# Patient Record
Sex: FEMALE | Race: WHITE | Employment: OTHER | URBAN - METROPOLITAN AREA
[De-identification: names, ages, dates, MRNs, and addresses within clinical notes are randomized per-mention and may not be internally consistent; named-entity substitution may affect disease eponyms.]

---

## 2017-04-24 ENCOUNTER — OFFICE VISIT (OUTPATIENT)
Dept: INTERNAL MEDICINE CLINIC | Age: 67
End: 2017-04-24

## 2017-04-24 VITALS
OXYGEN SATURATION: 95 % | WEIGHT: 134 LBS | BODY MASS INDEX: 23.74 KG/M2 | TEMPERATURE: 98.9 F | DIASTOLIC BLOOD PRESSURE: 76 MMHG | HEIGHT: 63 IN | SYSTOLIC BLOOD PRESSURE: 120 MMHG | HEART RATE: 92 BPM

## 2017-04-24 DIAGNOSIS — E55.9 HYPOVITAMINOSIS D: ICD-10-CM

## 2017-04-24 DIAGNOSIS — J20.9 ACUTE BRONCHITIS, UNSPECIFIED ORGANISM: Primary | ICD-10-CM

## 2017-04-24 DIAGNOSIS — E78.2 MIXED HYPERLIPIDEMIA: ICD-10-CM

## 2017-04-24 LAB
A/G RATIO: 1.7 (ref 1.1–2.2)
ALBUMIN SERPL-MCNC: 4.1 G/DL (ref 3.4–5)
ALP BLD-CCNC: 88 U/L (ref 40–129)
ALT SERPL-CCNC: 41 U/L (ref 10–40)
ANION GAP SERPL CALCULATED.3IONS-SCNC: 16 MMOL/L (ref 3–16)
AST SERPL-CCNC: 42 U/L (ref 15–37)
BILIRUB SERPL-MCNC: <0.2 MG/DL (ref 0–1)
BILIRUBIN, POC: NORMAL
BLOOD URINE, POC: NORMAL
BUN BLDV-MCNC: 14 MG/DL (ref 7–20)
CALCIUM SERPL-MCNC: 8.9 MG/DL (ref 8.3–10.6)
CHLORIDE BLD-SCNC: 102 MMOL/L (ref 99–110)
CHOLESTEROL, TOTAL: 165 MG/DL (ref 0–199)
CLARITY, POC: NORMAL
CO2: 24 MMOL/L (ref 21–32)
COLOR, POC: YELLOW
CREAT SERPL-MCNC: 0.7 MG/DL (ref 0.6–1.2)
GFR AFRICAN AMERICAN: >60
GFR NON-AFRICAN AMERICAN: >60
GLOBULIN: 2.4 G/DL
GLUCOSE BLD-MCNC: 101 MG/DL (ref 70–99)
GLUCOSE URINE, POC: NORMAL
HCT VFR BLD CALC: 39.3 % (ref 36–48)
HDLC SERPL-MCNC: 45 MG/DL (ref 40–60)
HEMOGLOBIN: 13.1 G/DL (ref 12–16)
KETONES, POC: NORMAL
LDL CHOLESTEROL CALCULATED: 65 MG/DL
LEUKOCYTE EST, POC: NORMAL
MCH RBC QN AUTO: 30.1 PG (ref 26–34)
MCHC RBC AUTO-ENTMCNC: 33.5 G/DL (ref 31–36)
MCV RBC AUTO: 89.9 FL (ref 80–100)
NITRITE, POC: NORMAL
PDW BLD-RTO: 14.5 % (ref 12.4–15.4)
PH, POC: 7
PLATELET # BLD: 197 K/UL (ref 135–450)
PMV BLD AUTO: 9.5 FL (ref 5–10.5)
POTASSIUM SERPL-SCNC: 4 MMOL/L (ref 3.5–5.1)
PROTEIN, POC: NORMAL
RBC # BLD: 4.37 M/UL (ref 4–5.2)
SODIUM BLD-SCNC: 142 MMOL/L (ref 136–145)
SPECIFIC GRAVITY, POC: 1.02
TOTAL PROTEIN: 6.5 G/DL (ref 6.4–8.2)
TRIGL SERPL-MCNC: 273 MG/DL (ref 0–150)
UROBILINOGEN, POC: 0.2
VLDLC SERPL CALC-MCNC: 55 MG/DL
WBC # BLD: 4.6 K/UL (ref 4–11)

## 2017-04-24 PROCEDURE — G8400 PT W/DXA NO RESULTS DOC: HCPCS | Performed by: INTERNAL MEDICINE

## 2017-04-24 PROCEDURE — 1123F ACP DISCUSS/DSCN MKR DOCD: CPT | Performed by: INTERNAL MEDICINE

## 2017-04-24 PROCEDURE — 3014F SCREEN MAMMO DOC REV: CPT | Performed by: INTERNAL MEDICINE

## 2017-04-24 PROCEDURE — 3017F COLORECTAL CA SCREEN DOC REV: CPT | Performed by: INTERNAL MEDICINE

## 2017-04-24 PROCEDURE — 36415 COLL VENOUS BLD VENIPUNCTURE: CPT | Performed by: INTERNAL MEDICINE

## 2017-04-24 PROCEDURE — 1090F PRES/ABSN URINE INCON ASSESS: CPT | Performed by: INTERNAL MEDICINE

## 2017-04-24 PROCEDURE — G8420 CALC BMI NORM PARAMETERS: HCPCS | Performed by: INTERNAL MEDICINE

## 2017-04-24 PROCEDURE — 4004F PT TOBACCO SCREEN RCVD TLK: CPT | Performed by: INTERNAL MEDICINE

## 2017-04-24 PROCEDURE — 81002 URINALYSIS NONAUTO W/O SCOPE: CPT | Performed by: INTERNAL MEDICINE

## 2017-04-24 PROCEDURE — G8427 DOCREV CUR MEDS BY ELIG CLIN: HCPCS | Performed by: INTERNAL MEDICINE

## 2017-04-24 PROCEDURE — 99214 OFFICE O/P EST MOD 30 MIN: CPT | Performed by: INTERNAL MEDICINE

## 2017-04-24 PROCEDURE — 4040F PNEUMOC VAC/ADMIN/RCVD: CPT | Performed by: INTERNAL MEDICINE

## 2017-04-24 RX ORDER — LEVOFLOXACIN 500 MG/1
500 TABLET, FILM COATED ORAL DAILY
Qty: 10 TABLET | Refills: 0 | Status: SHIPPED | OUTPATIENT
Start: 2017-04-24 | End: 2017-04-27

## 2017-04-24 RX ORDER — HYDROCODONE POLISTIREX AND CHLORPHENIRAMINE POLISTIREX 10; 8 MG/5ML; MG/5ML
5 SUSPENSION, EXTENDED RELEASE ORAL EVERY 12 HOURS PRN
Qty: 120 ML | Refills: 0 | Status: SHIPPED | OUTPATIENT
Start: 2017-04-24 | End: 2018-08-21

## 2017-04-24 RX ORDER — ATORVASTATIN CALCIUM 10 MG/1
10 TABLET, FILM COATED ORAL DAILY
Qty: 30 TABLET | Refills: 6 | Status: SHIPPED | OUTPATIENT
Start: 2017-04-24 | End: 2018-06-12 | Stop reason: SDUPTHER

## 2017-04-24 ASSESSMENT — ENCOUNTER SYMPTOMS
COLOR CHANGE: 0
DIARRHEA: 1
COUGH: 1
WHEEZING: 1
EYES NEGATIVE: 1
APNEA: 0
STRIDOR: 0
SORE THROAT: 1
CHOKING: 0
BACK PAIN: 0
ABDOMINAL PAIN: 1
SINUS PRESSURE: 0

## 2017-04-25 LAB
TSH SERPL DL<=0.05 MIU/L-ACNC: 4.04 UIU/ML (ref 0.27–4.2)
VITAMIN D 25-HYDROXY: 21.4 NG/ML

## 2017-04-27 ENCOUNTER — TELEPHONE (OUTPATIENT)
Dept: INTERNAL MEDICINE CLINIC | Age: 67
End: 2017-04-27

## 2017-04-27 DIAGNOSIS — R05.9 COUGH: Primary | ICD-10-CM

## 2017-04-27 RX ORDER — AZITHROMYCIN 250 MG/1
TABLET, FILM COATED ORAL
Qty: 6 TABLET | Refills: 0 | Status: SHIPPED | OUTPATIENT
Start: 2017-04-27 | End: 2017-05-07

## 2017-04-27 RX ORDER — ALBUTEROL SULFATE 90 UG/1
2 AEROSOL, METERED RESPIRATORY (INHALATION) EVERY 6 HOURS PRN
Qty: 1 INHALER | Refills: 0 | Status: SHIPPED | OUTPATIENT
Start: 2017-04-27 | End: 2018-08-21

## 2017-04-27 RX ORDER — METHYLPREDNISOLONE 4 MG/1
TABLET ORAL
Qty: 1 KIT | Refills: 0 | Status: SHIPPED | OUTPATIENT
Start: 2017-04-27 | End: 2017-05-03

## 2018-02-20 ENCOUNTER — NURSE ONLY (OUTPATIENT)
Dept: INTERNAL MEDICINE CLINIC | Age: 68
End: 2018-02-20

## 2018-02-20 DIAGNOSIS — Z23 NEED FOR PNEUMOCOCCAL VACCINE: Primary | ICD-10-CM

## 2018-02-20 PROCEDURE — 90670 PCV13 VACCINE IM: CPT | Performed by: INTERNAL MEDICINE

## 2018-02-20 PROCEDURE — G0009 ADMIN PNEUMOCOCCAL VACCINE: HCPCS | Performed by: INTERNAL MEDICINE

## 2018-06-12 DIAGNOSIS — E78.2 MIXED HYPERLIPIDEMIA: ICD-10-CM

## 2018-06-12 RX ORDER — ATORVASTATIN CALCIUM 10 MG/1
TABLET, FILM COATED ORAL
Qty: 90 TABLET | Refills: 1 | Status: SHIPPED | OUTPATIENT
Start: 2018-06-12 | End: 2019-03-21

## 2018-06-22 ENCOUNTER — TELEPHONE (OUTPATIENT)
Dept: INTERNAL MEDICINE CLINIC | Age: 68
End: 2018-06-22

## 2018-06-22 RX ORDER — SULFAMETHOXAZOLE AND TRIMETHOPRIM 800; 160 MG/1; MG/1
1 TABLET ORAL 2 TIMES DAILY
Qty: 20 TABLET | Refills: 0 | Status: SHIPPED | OUTPATIENT
Start: 2018-06-22 | End: 2018-07-02

## 2018-07-16 ENCOUNTER — TELEPHONE (OUTPATIENT)
Dept: INTERNAL MEDICINE CLINIC | Age: 68
End: 2018-07-16

## 2018-07-16 DIAGNOSIS — J06.9 ACUTE URI: Primary | ICD-10-CM

## 2018-07-16 RX ORDER — LEVOFLOXACIN 500 MG/1
500 TABLET, FILM COATED ORAL DAILY
Qty: 10 TABLET | Refills: 0 | Status: SHIPPED | OUTPATIENT
Start: 2018-07-16 | End: 2018-07-26

## 2018-07-16 RX ORDER — GUAIFENESIN AND CODEINE PHOSPHATE 100; 10 MG/5ML; MG/5ML
10 SOLUTION ORAL 3 TIMES DAILY PRN
Qty: 120 ML | Refills: 0 | Status: SHIPPED | OUTPATIENT
Start: 2018-07-16 | End: 2018-07-23

## 2018-07-16 NOTE — TELEPHONE ENCOUNTER
Pt has a cough,sinus headache,clear drainage and congestion. Pt wants to know if meds can be sent to [facility] [place].

## 2018-08-21 ENCOUNTER — OFFICE VISIT (OUTPATIENT)
Dept: INTERNAL MEDICINE CLINIC | Age: 68
End: 2018-08-21

## 2018-08-21 VITALS
HEART RATE: 83 BPM | SYSTOLIC BLOOD PRESSURE: 126 MMHG | WEIGHT: 133.5 LBS | DIASTOLIC BLOOD PRESSURE: 78 MMHG | OXYGEN SATURATION: 97 % | BODY MASS INDEX: 23.46 KG/M2 | TEMPERATURE: 98.2 F

## 2018-08-21 DIAGNOSIS — E78.2 MIXED HYPERLIPIDEMIA: ICD-10-CM

## 2018-08-21 DIAGNOSIS — Z72.0 TOBACCO ABUSE: ICD-10-CM

## 2018-08-21 DIAGNOSIS — M60.9 MYOSITIS, UNSPECIFIED MYOSITIS TYPE, UNSPECIFIED SITE: Primary | ICD-10-CM

## 2018-08-21 PROCEDURE — 99214 OFFICE O/P EST MOD 30 MIN: CPT | Performed by: INTERNAL MEDICINE

## 2018-08-21 PROCEDURE — 1123F ACP DISCUSS/DSCN MKR DOCD: CPT | Performed by: INTERNAL MEDICINE

## 2018-08-21 PROCEDURE — G8400 PT W/DXA NO RESULTS DOC: HCPCS | Performed by: INTERNAL MEDICINE

## 2018-08-21 PROCEDURE — 4040F PNEUMOC VAC/ADMIN/RCVD: CPT | Performed by: INTERNAL MEDICINE

## 2018-08-21 PROCEDURE — 3017F COLORECTAL CA SCREEN DOC REV: CPT | Performed by: INTERNAL MEDICINE

## 2018-08-21 PROCEDURE — 1090F PRES/ABSN URINE INCON ASSESS: CPT | Performed by: INTERNAL MEDICINE

## 2018-08-21 PROCEDURE — 4004F PT TOBACCO SCREEN RCVD TLK: CPT | Performed by: INTERNAL MEDICINE

## 2018-08-21 PROCEDURE — G8420 CALC BMI NORM PARAMETERS: HCPCS | Performed by: INTERNAL MEDICINE

## 2018-08-21 PROCEDURE — G8427 DOCREV CUR MEDS BY ELIG CLIN: HCPCS | Performed by: INTERNAL MEDICINE

## 2018-08-21 PROCEDURE — 1101F PT FALLS ASSESS-DOCD LE1/YR: CPT | Performed by: INTERNAL MEDICINE

## 2018-08-21 ASSESSMENT — PATIENT HEALTH QUESTIONNAIRE - PHQ9
1. LITTLE INTEREST OR PLEASURE IN DOING THINGS: 0
2. FEELING DOWN, DEPRESSED OR HOPELESS: 0
SUM OF ALL RESPONSES TO PHQ QUESTIONS 1-9: 0
SUM OF ALL RESPONSES TO PHQ QUESTIONS 1-9: 0
SUM OF ALL RESPONSES TO PHQ9 QUESTIONS 1 & 2: 0

## 2018-08-21 ASSESSMENT — ENCOUNTER SYMPTOMS
GASTROINTESTINAL NEGATIVE: 1
EYES NEGATIVE: 1
RESPIRATORY NEGATIVE: 1

## 2018-08-21 NOTE — PROGRESS NOTES
Subjective:      Patient ID: Laura Gonsalez is a 69 y.o. female. Patient presents with:  Pain: leg and foot pain and cramping.  mostly right but sometimes both, usually happens at night. only relief is with hot wet towels. has had some stiffness/pain/cramping in other parts of her body when stretching. On lipitor for hyperlipidemia. Worry PAD from smoking. Laura Gonsalez is a 69 y.o. female with the following history as recorded in [facility]:  Patient Active Problem List    Mixed hyperlipidemia         Date Noted: 07/12/2010      Current Outpatient Prescriptions:  atorvastatin (LIPITOR) 10 MG tablet, TAKE ONE TABLET BY MOUTH EVERY DAY, Disp: 90 tablet, Rfl: 1  vitamin D (ERGOCALCIFEROL) 72995 UNITS CAPS capsule, Take 1 capsule by mouth once a week Take prescription supplement for 3 months, then switch to OTC vitamin D3 at 2000 IU/day for maintenance., Disp: 12 capsule, Rfl: 3  ibuprofen (ADVIL) 200 MG CAPS, Take 1 capsule by mouth., Disp: , Rfl:   vitamin B-12 (CYANOCOBALAMIN) 100 MCG tablet, Take 50 mcg by mouth daily. , Disp: , Rfl:   Probiotic Product (HEALTHY COLON PO), Take  by mouth daily. , Disp: , Rfl:      No current facility-administered medications for this visit. Allergies: Latex;  Penicillins; and Darvon (propoxyphene hcl)  Past Medical History:  1980: Cancer ([facility] [address]      Comment: carcinoma in situ uterine  2010: Chronic kidney disease      Comment: rt side renal stone  7/12/2010: Mixed hyperlipidemia  No date: Seasonal allergies  No date: Vitamin D deficiency  Past Surgical History:  No date: APPENDECTOMY  1968: BREAST SURGERY      Comment: lumpectomy on right   1968: CHOLECYSTECTOMY  2007: COLONOSCOPY      Comment: diverticulitis  No date: HYSTERECTOMY  No date: NECK SURGERY  Review of patient's family history indicates:  Problem: Arthritis      Relation: Mother       Age of Onset: (Not Specified)   Problem: High Blood Pressure      Relation: Mother       Age of Onset: (Not Specified) numbness. Feet tingling. Psychiatric/Behavioral: Negative. Objective:   Physical Exam   Constitutional: She is oriented to person, place, and time. She appears well-developed and well-nourished. HENT:   Head: Normocephalic and atraumatic. Right Ear: External ear normal.   Left Ear: External ear normal.   Mouth/Throat: No oropharyngeal exudate. Eyes: Pupils are equal, round, and reactive to light. Conjunctivae and EOM are normal. No scleral icterus. Neck: Normal range of motion. Neck supple. No thyromegaly present. Cardiovascular: Normal rate, regular rhythm and normal heart sounds. No murmur heard. Pulses of feet are intact. Pulmonary/Chest: Effort normal and breath sounds normal. She has no wheezes. She exhibits no tenderness. Smoking. Abdominal: Soft. She exhibits no distension and no mass. There is no tenderness. Musculoskeletal: Normal range of motion. She exhibits no edema or tenderness. Both lower leg muscles are tight and tender. Lymphadenopathy:     She has no cervical adenopathy. Neurological: She is alert and oriented to person, place, and time. She has normal reflexes. Skin: Skin is warm. No erythema. Psychiatric: She has a normal mood and affect. Thought content normal.       Assessment:      Encounter Diagnoses   Name Primary?  Myositis, unspecified myositis type, unspecified site Yes    Mixed hyperlipidemia     Tobacco abuse            Plan:      Adam Bonds was seen today for pain. Diagnoses and all orders for this visit:    Myositis, unspecified myositis type, unspecified site    Mixed hyperlipidemia    Tobacco abuse    myositis of legs is possible side effect of Statin. PAD wa ruled out with good pulses of feet. Advised to stop smoking. May need blood test for myositis if holding Lipitor does not help.           Rosalina Melvin MD

## 2019-01-02 ENCOUNTER — TELEPHONE (OUTPATIENT)
Dept: INTERNAL MEDICINE CLINIC | Age: 69
End: 2019-01-02

## 2019-01-02 DIAGNOSIS — R05.9 COUGH: Primary | ICD-10-CM

## 2019-01-03 RX ORDER — LEVOFLOXACIN 250 MG/1
250 TABLET ORAL DAILY
Qty: 7 TABLET | Refills: 0 | Status: SHIPPED | OUTPATIENT
Start: 2019-01-03 | End: 2019-01-07

## 2019-01-03 RX ORDER — GUAIFENESIN AND CODEINE PHOSPHATE 100; 10 MG/5ML; MG/5ML
10 SOLUTION ORAL 3 TIMES DAILY PRN
Qty: 120 ML | Refills: 0 | Status: SHIPPED | OUTPATIENT
Start: 2019-01-03 | End: 2019-01-06

## 2019-01-07 ENCOUNTER — TELEPHONE (OUTPATIENT)
Dept: INTERNAL MEDICINE CLINIC | Age: 69
End: 2019-01-07

## 2019-01-07 RX ORDER — AZITHROMYCIN 250 MG/1
TABLET, FILM COATED ORAL
Qty: 6 TABLET | Refills: 0 | Status: SHIPPED | OUTPATIENT
Start: 2019-01-07 | End: 2019-01-17

## 2019-03-21 ENCOUNTER — OFFICE VISIT (OUTPATIENT)
Dept: INTERNAL MEDICINE CLINIC | Age: 69
End: 2019-03-21
Payer: MEDICARE

## 2019-03-21 VITALS
HEART RATE: 101 BPM | DIASTOLIC BLOOD PRESSURE: 80 MMHG | TEMPERATURE: 98.9 F | OXYGEN SATURATION: 97 % | BODY MASS INDEX: 23.37 KG/M2 | SYSTOLIC BLOOD PRESSURE: 122 MMHG | WEIGHT: 133 LBS

## 2019-03-21 DIAGNOSIS — J20.9 ACUTE BRONCHITIS, UNSPECIFIED ORGANISM: ICD-10-CM

## 2019-03-21 DIAGNOSIS — R05.9 COUGH: Primary | ICD-10-CM

## 2019-03-21 DIAGNOSIS — R50.9 FEVER, UNSPECIFIED FEVER CAUSE: ICD-10-CM

## 2019-03-21 DIAGNOSIS — R52 BODY ACHES: ICD-10-CM

## 2019-03-21 DIAGNOSIS — Z72.0 TOBACCO ABUSE: ICD-10-CM

## 2019-03-21 DIAGNOSIS — Z12.11 COLON CANCER SCREENING: ICD-10-CM

## 2019-03-21 LAB
INFLUENZA A ANTIBODY: NORMAL
INFLUENZA B ANTIBODY: NORMAL

## 2019-03-21 PROCEDURE — 1090F PRES/ABSN URINE INCON ASSESS: CPT | Performed by: INTERNAL MEDICINE

## 2019-03-21 PROCEDURE — 1101F PT FALLS ASSESS-DOCD LE1/YR: CPT | Performed by: INTERNAL MEDICINE

## 2019-03-21 PROCEDURE — 3017F COLORECTAL CA SCREEN DOC REV: CPT | Performed by: INTERNAL MEDICINE

## 2019-03-21 PROCEDURE — 4040F PNEUMOC VAC/ADMIN/RCVD: CPT | Performed by: INTERNAL MEDICINE

## 2019-03-21 PROCEDURE — 1123F ACP DISCUSS/DSCN MKR DOCD: CPT | Performed by: INTERNAL MEDICINE

## 2019-03-21 PROCEDURE — 87804 INFLUENZA ASSAY W/OPTIC: CPT | Performed by: INTERNAL MEDICINE

## 2019-03-21 PROCEDURE — G8420 CALC BMI NORM PARAMETERS: HCPCS | Performed by: INTERNAL MEDICINE

## 2019-03-21 PROCEDURE — G8400 PT W/DXA NO RESULTS DOC: HCPCS | Performed by: INTERNAL MEDICINE

## 2019-03-21 PROCEDURE — G8427 DOCREV CUR MEDS BY ELIG CLIN: HCPCS | Performed by: INTERNAL MEDICINE

## 2019-03-21 PROCEDURE — 99214 OFFICE O/P EST MOD 30 MIN: CPT | Performed by: INTERNAL MEDICINE

## 2019-03-21 PROCEDURE — 4004F PT TOBACCO SCREEN RCVD TLK: CPT | Performed by: INTERNAL MEDICINE

## 2019-03-21 PROCEDURE — G8484 FLU IMMUNIZE NO ADMIN: HCPCS | Performed by: INTERNAL MEDICINE

## 2019-03-21 RX ORDER — AZITHROMYCIN 250 MG/1
TABLET, FILM COATED ORAL
Qty: 6 TABLET | Refills: 0 | Status: SHIPPED | OUTPATIENT
Start: 2019-03-21 | End: 2019-03-28

## 2019-03-21 ASSESSMENT — PATIENT HEALTH QUESTIONNAIRE - PHQ9
SUM OF ALL RESPONSES TO PHQ QUESTIONS 1-9: 0
1. LITTLE INTEREST OR PLEASURE IN DOING THINGS: 0
SUM OF ALL RESPONSES TO PHQ9 QUESTIONS 1 & 2: 0
2. FEELING DOWN, DEPRESSED OR HOPELESS: 0
SUM OF ALL RESPONSES TO PHQ QUESTIONS 1-9: 0

## 2019-03-21 ASSESSMENT — ENCOUNTER SYMPTOMS
VOICE CHANGE: 1
COUGH: 1
DIARRHEA: 1
SHORTNESS OF BREATH: 0
SORE THROAT: 1
CONSTIPATION: 1
EYES NEGATIVE: 1

## 2019-03-28 ENCOUNTER — HOSPITAL ENCOUNTER (OUTPATIENT)
Dept: GENERAL RADIOLOGY | Age: 69
Discharge: HOME | End: 2019-03-28
Payer: MEDICARE

## 2019-03-28 ENCOUNTER — OFFICE VISIT (OUTPATIENT)
Dept: INTERNAL MEDICINE CLINIC | Age: 69
End: 2019-03-28
Payer: MEDICARE

## 2019-03-28 ENCOUNTER — HOSPITAL ENCOUNTER (OUTPATIENT)
Age: 69
Discharge: HOME | End: 2019-03-28
Payer: MEDICARE

## 2019-03-28 VITALS
DIASTOLIC BLOOD PRESSURE: 72 MMHG | TEMPERATURE: 98.2 F | HEART RATE: 82 BPM | SYSTOLIC BLOOD PRESSURE: 116 MMHG | WEIGHT: 131 LBS | OXYGEN SATURATION: 97 % | BODY MASS INDEX: 23.02 KG/M2

## 2019-03-28 DIAGNOSIS — H61.22 EXCESSIVE CERUMEN IN LEFT EAR CANAL: ICD-10-CM

## 2019-03-28 DIAGNOSIS — E78.00 PURE HYPERCHOLESTEROLEMIA: ICD-10-CM

## 2019-03-28 DIAGNOSIS — E55.9 HYPOVITAMINOSIS D: Primary | ICD-10-CM

## 2019-03-28 DIAGNOSIS — R05.9 COUGH: ICD-10-CM

## 2019-03-28 DIAGNOSIS — Z72.0 TOBACCO ABUSE: ICD-10-CM

## 2019-03-28 DIAGNOSIS — Z00.00 ANNUAL PHYSICAL EXAM: ICD-10-CM

## 2019-03-28 LAB
A/G RATIO: 1.8 (ref 1.1–2.2)
ALBUMIN SERPL-MCNC: 4.8 G/DL (ref 3.4–5)
ALP BLD-CCNC: 71 U/L (ref 40–129)
ALT SERPL-CCNC: 30 U/L (ref 10–40)
ANION GAP SERPL CALCULATED.3IONS-SCNC: 13 MMOL/L (ref 3–16)
AST SERPL-CCNC: 23 U/L (ref 15–37)
BILIRUB SERPL-MCNC: 0.4 MG/DL (ref 0–1)
BILIRUBIN, POC: NORMAL
BLOOD URINE, POC: NORMAL
BUN BLDV-MCNC: 21 MG/DL (ref 7–20)
CALCIUM SERPL-MCNC: 10 MG/DL (ref 8.3–10.6)
CHLORIDE BLD-SCNC: 105 MMOL/L (ref 99–110)
CHOLESTEROL, TOTAL: 241 MG/DL (ref 0–199)
CLARITY, POC: CLEAR
CO2: 27 MMOL/L (ref 21–32)
COLOR, POC: YELLOW
CREAT SERPL-MCNC: 0.8 MG/DL (ref 0.6–1.2)
GFR AFRICAN AMERICAN: >60
GFR NON-AFRICAN AMERICAN: >60
GLOBULIN: 2.6 G/DL
GLUCOSE BLD-MCNC: 105 MG/DL (ref 70–99)
GLUCOSE URINE, POC: NORMAL
HCT VFR BLD CALC: 44.6 % (ref 36–48)
HDLC SERPL-MCNC: 43 MG/DL (ref 40–60)
HEMOGLOBIN: 14.9 G/DL (ref 12–16)
KETONES, POC: NORMAL
LDL CHOLESTEROL CALCULATED: 163 MG/DL
LEUKOCYTE EST, POC: NORMAL
MCH RBC QN AUTO: 30.4 PG (ref 26–34)
MCHC RBC AUTO-ENTMCNC: 33.3 G/DL (ref 31–36)
MCV RBC AUTO: 91.2 FL (ref 80–100)
NITRITE, POC: NORMAL
PDW BLD-RTO: 14.1 % (ref 12.4–15.4)
PH, POC: 5
PLATELET # BLD: 240 K/UL (ref 135–450)
PMV BLD AUTO: 9 FL (ref 5–10.5)
POTASSIUM SERPL-SCNC: 4.4 MMOL/L (ref 3.5–5.1)
PROTEIN, POC: NORMAL
RBC # BLD: 4.89 M/UL (ref 4–5.2)
SODIUM BLD-SCNC: 145 MMOL/L (ref 136–145)
SPECIFIC GRAVITY, POC: 1.02
TOTAL PROTEIN: 7.4 G/DL (ref 6.4–8.2)
TRIGL SERPL-MCNC: 175 MG/DL (ref 0–150)
TSH SERPL DL<=0.05 MIU/L-ACNC: 3.86 UIU/ML (ref 0.27–4.2)
UROBILINOGEN, POC: 0.2
VITAMIN D 25-HYDROXY: 67 NG/ML
VLDLC SERPL CALC-MCNC: 35 MG/DL
WBC # BLD: 10.7 K/UL (ref 4–11)

## 2019-03-28 PROCEDURE — 3017F COLORECTAL CA SCREEN DOC REV: CPT | Performed by: INTERNAL MEDICINE

## 2019-03-28 PROCEDURE — G8400 PT W/DXA NO RESULTS DOC: HCPCS | Performed by: INTERNAL MEDICINE

## 2019-03-28 PROCEDURE — 71046 X-RAY EXAM CHEST 2 VIEWS: CPT

## 2019-03-28 PROCEDURE — 1090F PRES/ABSN URINE INCON ASSESS: CPT | Performed by: INTERNAL MEDICINE

## 2019-03-28 PROCEDURE — 4004F PT TOBACCO SCREEN RCVD TLK: CPT | Performed by: INTERNAL MEDICINE

## 2019-03-28 PROCEDURE — 1123F ACP DISCUSS/DSCN MKR DOCD: CPT | Performed by: INTERNAL MEDICINE

## 2019-03-28 PROCEDURE — 4040F PNEUMOC VAC/ADMIN/RCVD: CPT | Performed by: INTERNAL MEDICINE

## 2019-03-28 PROCEDURE — 81002 URINALYSIS NONAUTO W/O SCOPE: CPT | Performed by: INTERNAL MEDICINE

## 2019-03-28 PROCEDURE — 69210 REMOVE IMPACTED EAR WAX UNI: CPT | Performed by: INTERNAL MEDICINE

## 2019-03-28 PROCEDURE — G8484 FLU IMMUNIZE NO ADMIN: HCPCS | Performed by: INTERNAL MEDICINE

## 2019-03-28 PROCEDURE — G8420 CALC BMI NORM PARAMETERS: HCPCS | Performed by: INTERNAL MEDICINE

## 2019-03-28 PROCEDURE — 36415 COLL VENOUS BLD VENIPUNCTURE: CPT | Performed by: INTERNAL MEDICINE

## 2019-03-28 PROCEDURE — G8427 DOCREV CUR MEDS BY ELIG CLIN: HCPCS | Performed by: INTERNAL MEDICINE

## 2019-03-28 PROCEDURE — 99214 OFFICE O/P EST MOD 30 MIN: CPT | Performed by: INTERNAL MEDICINE

## 2019-03-28 RX ORDER — AZITHROMYCIN 250 MG/1
TABLET, FILM COATED ORAL
Qty: 6 TABLET | Refills: 0 | Status: SHIPPED | OUTPATIENT
Start: 2019-03-28 | End: 2019-04-07

## 2019-03-28 ASSESSMENT — ENCOUNTER SYMPTOMS
CONSTIPATION: 1
COUGH: 1
DIARRHEA: 1

## 2019-03-29 DIAGNOSIS — E78.00 PURE HYPERCHOLESTEROLEMIA: Primary | ICD-10-CM

## 2019-03-29 RX ORDER — PRAVASTATIN SODIUM 20 MG
20 TABLET ORAL EVERY EVENING
Qty: 30 TABLET | Refills: 3 | Status: SHIPPED | OUTPATIENT
Start: 2019-03-29 | End: 2019-04-30

## 2019-04-30 NOTE — PROGRESS NOTES
[address] [facility] time___0900_________        Surgery time___1000_________    Take the following medications with a sip of water:    Do not eat or drink anything after 12:00 midnight prior to your surgery. EXCEPT PREP  This includes water chewing gum, mints and ice chips. You may brush your teeth and gargle the morning of your surgery, but do not swallow the water      You may be asked to stop blood thinners such as Coumadin, Plavix, Fragmin, Lovenox, etc., or any anti-inflammatories such as:  Aspirin, Ibuprofen, Advil, Naproxen prior to your surgery. We also ask that you stop any OTC medications such as fish oil, vitamin E, glucosamine, garlic, Multivitamins, COQ 10, etc.    We ask that you do not smoke 24 hours prior to surgery  We ask that you do not  drink any alcoholic beverages 24 hours prior to surgery     You must make arrangements for a responsible adult to take you home after your surgery. For your safety you will not be allowed to leave alone or drive yourself home. Your surgery will be cancelled if you do not have a ride home. Also for your safety, it is strongly suggested that someone stay with you the first 24 hours after your surgery. A parent or legal guardian must accompany a child scheduled for surgery and plan to stay at the hospital until the child is discharged. Please do not bring other children with you. For your comfort, please wear simple loose fitting clothing to the hospital.  Please do not bring valuables. Do not wear any make-up or nail polish on your fingers or toes      For your safety, please do not wear any jewelry or body piercing's on the day of surgery. All jewelry must be removed. If you have dentures, they will be removed before going to operating room. For your convenience, we will provide you with a container.     If you wear contact lenses or glasses, they will be removed, please bring a case for them.     If you have a living will and a durable power of  for healthcare, please bring in a copy. As part of our patient safety program to minimize surgical site infections, we ask you to do the following:    · Please notify your surgeon if you develop any illness between         now and the  day of your surgery. · This includes a cough, cold, fever, sore throat, nausea,         or vomiting, and diarrhea, etc.  ·  Please notify your surgeon if you experience dizziness, shortness         of breath or blurred vision between now and the time of your surgery. You may shower the night before surgery or the morning of   your surgery with an antibacterial soap. You will need to bring a photo ID and insurance card    [facility] has an onsite pharmacy, would you like to utilize our pharmacy     If you will be staying overnight and use a C-pap machine, please bring   your C-pap to hospital     Our goal is to provide you with excellent care, therefore, visitors will be limited to two(2) in the room at a time so that we may focus on providing this care for you. Please contact pre-admission testing if you have any further questions. [facility] phone number:  [phone]  Please note these are generalized instructions for all surgical cases, you may be provided with more specific instructions according to your surgery.

## 2019-05-08 ENCOUNTER — ANESTHESIA EVENT (OUTPATIENT)
Dept: ENDOSCOPY | Age: 69
End: 2019-05-08
Payer: MEDICARE

## 2019-05-08 ENCOUNTER — HOSPITAL ENCOUNTER (OUTPATIENT)
Age: 69
Setting detail: OUTPATIENT SURGERY
Discharge: HOME | End: 2019-05-08
Attending: INTERNAL MEDICINE | Admitting: INTERNAL MEDICINE
Payer: MEDICARE

## 2019-05-08 ENCOUNTER — ANESTHESIA (OUTPATIENT)
Dept: ENDOSCOPY | Age: 69
End: 2019-05-08
Payer: MEDICARE

## 2019-05-08 VITALS
HEART RATE: 66 BPM | HEIGHT: 63 IN | RESPIRATION RATE: 14 BRPM | TEMPERATURE: 97.1 F | SYSTOLIC BLOOD PRESSURE: 146 MMHG | WEIGHT: 132 LBS | OXYGEN SATURATION: 100 % | DIASTOLIC BLOOD PRESSURE: 87 MMHG | BODY MASS INDEX: 23.39 KG/M2

## 2019-05-08 VITALS — OXYGEN SATURATION: 97 % | DIASTOLIC BLOOD PRESSURE: 98 MMHG | SYSTOLIC BLOOD PRESSURE: 164 MMHG

## 2019-05-08 PROCEDURE — 3609009500 HC COLONOSCOPY DIAGNOSTIC OR SCREENING: Performed by: INTERNAL MEDICINE

## 2019-05-08 PROCEDURE — 7100000011 HC PHASE II RECOVERY - ADDTL 15 MIN: Performed by: INTERNAL MEDICINE

## 2019-05-08 PROCEDURE — 6360000002 HC RX W HCPCS: Performed by: NURSE ANESTHETIST, CERTIFIED REGISTERED

## 2019-05-08 PROCEDURE — 7100000010 HC PHASE II RECOVERY - FIRST 15 MIN: Performed by: INTERNAL MEDICINE

## 2019-05-08 PROCEDURE — 3700000001 HC ADD 15 MINUTES (ANESTHESIA): Performed by: INTERNAL MEDICINE

## 2019-05-08 PROCEDURE — 3700000000 HC ANESTHESIA ATTENDED CARE: Performed by: INTERNAL MEDICINE

## 2019-05-08 PROCEDURE — 2500000003 HC RX 250 WO HCPCS: Performed by: NURSE ANESTHETIST, CERTIFIED REGISTERED

## 2019-05-08 PROCEDURE — 2580000003 HC RX 258: Performed by: ANESTHESIOLOGY

## 2019-05-08 RX ORDER — MORPHINE SULFATE 4 MG/ML
3 INJECTION, SOLUTION INTRAMUSCULAR; INTRAVENOUS
Status: ACTIVE | OUTPATIENT
Start: 2019-05-08 | End: 2019-05-08

## 2019-05-08 RX ORDER — MEPERIDINE HYDROCHLORIDE 25 MG/ML
12.5 INJECTION INTRAMUSCULAR; INTRAVENOUS; SUBCUTANEOUS EVERY 5 MIN PRN
Status: DISCONTINUED | OUTPATIENT
Start: 2019-05-08 | End: 2019-05-08 | Stop reason: HOSPADM

## 2019-05-08 RX ORDER — SODIUM CHLORIDE 0.9 % (FLUSH) 0.9 %
10 SYRINGE (ML) INJECTION PRN
Status: DISCONTINUED | OUTPATIENT
Start: 2019-05-08 | End: 2019-05-08 | Stop reason: HOSPADM

## 2019-05-08 RX ORDER — PROPOFOL 10 MG/ML
INJECTION, EMULSION INTRAVENOUS PRN
Status: DISCONTINUED | OUTPATIENT
Start: 2019-05-08 | End: 2019-05-08 | Stop reason: SDUPTHER

## 2019-05-08 RX ORDER — LIDOCAINE HYDROCHLORIDE 20 MG/ML
INJECTION, SOLUTION EPIDURAL; INFILTRATION; INTRACAUDAL; PERINEURAL PRN
Status: DISCONTINUED | OUTPATIENT
Start: 2019-05-08 | End: 2019-05-08 | Stop reason: SDUPTHER

## 2019-05-08 RX ORDER — SODIUM CHLORIDE 9 MG/ML
INJECTION, SOLUTION INTRAVENOUS CONTINUOUS
Status: DISCONTINUED | OUTPATIENT
Start: 2019-05-08 | End: 2019-05-08 | Stop reason: HOSPADM

## 2019-05-08 RX ORDER — SODIUM CHLORIDE 0.9 % (FLUSH) 0.9 %
10 SYRINGE (ML) INJECTION EVERY 12 HOURS SCHEDULED
Status: DISCONTINUED | OUTPATIENT
Start: 2019-05-08 | End: 2019-05-08 | Stop reason: HOSPADM

## 2019-05-08 RX ORDER — OXYCODONE HYDROCHLORIDE 5 MG/1
5 TABLET ORAL
Status: DISCONTINUED | OUTPATIENT
Start: 2019-05-08 | End: 2019-05-08 | Stop reason: HOSPADM

## 2019-05-08 RX ORDER — ONDANSETRON 2 MG/ML
4 INJECTION INTRAMUSCULAR; INTRAVENOUS
Status: DISCONTINUED | OUTPATIENT
Start: 2019-05-08 | End: 2019-05-08 | Stop reason: HOSPADM

## 2019-05-08 RX ORDER — HYDRALAZINE HYDROCHLORIDE 20 MG/ML
10 INJECTION INTRAMUSCULAR; INTRAVENOUS EVERY 10 MIN PRN
Status: DISCONTINUED | OUTPATIENT
Start: 2019-05-08 | End: 2019-05-08 | Stop reason: HOSPADM

## 2019-05-08 RX ADMIN — PROPOFOL 159 MG: 10 INJECTION, EMULSION INTRAVENOUS at 10:05

## 2019-05-08 RX ADMIN — SODIUM CHLORIDE: 0.9 INJECTION, SOLUTION INTRAVENOUS at 09:44

## 2019-05-08 RX ADMIN — LIDOCAINE HYDROCHLORIDE 50 MG: 20 INJECTION, SOLUTION EPIDURAL; INFILTRATION; INTRACAUDAL; PERINEURAL at 10:05

## 2019-05-08 RX ADMIN — PROPOFOL 50 MG: 10 INJECTION, EMULSION INTRAVENOUS at 10:10

## 2019-05-08 ASSESSMENT — LIFESTYLE VARIABLES: SMOKING_STATUS: 1

## 2019-05-08 ASSESSMENT — PAIN SCALES - GENERAL
PAINLEVEL_OUTOF10: 0

## 2019-05-08 ASSESSMENT — PAIN - FUNCTIONAL ASSESSMENT: PAIN_FUNCTIONAL_ASSESSMENT: 0-10

## 2019-05-08 NOTE — BRIEF OP NOTE
Brief Postoperative Note    Marv Najera  YOB: 1950  [id number]      Pre-operative Diagnosis: Screening    Previous Colonoscopy: Yes  When: 2008? Greater than 3 years? Yes      Post-operative Diagnosis: Same    Procedure: Colonoscopy    The preparation was good.     Anesthesia: MAC    Surgeons/Assistants: Joi Hough MD    Estimated Blood Loss: None    Complications: None    Specimens: Was Not Obtained    Findings: See dictated report    Electronically signed by Joi Hough MD on 7/10/2017 at 7:45 AM

## 2019-05-08 NOTE — ANESTHESIA PRE PROCEDURE
Tobacco comment: socially when drinking   Substance Use Topics    Alcohol use: Yes     Comment: socially once weekly                                Ready to quit: Not Answered  Counseling given: Not Answered  Comment: socially when drinking      Vital Signs (Current):   Vitals:    04/30/19 1605 05/08/19 0934   BP:  (!) 151/63   Pulse:  78   Resp:  14   Temp:  97 °F (36.1 °C)   TempSrc:  Temporal   SpO2:  100%   Weight: 130 lb (59 kg) 132 lb (59.9 kg)   Height: 5' 3\" (1.6 m) 5' 3\" (1.6 m)                                              BP Readings from Last 3 Encounters:   05/08/19 (!) 151/63   03/28/19 116/72   03/21/19 122/80       NPO Status:                                                                                 BMI:   Wt Readings from Last 3 Encounters:   05/08/19 132 lb (59.9 kg)   03/28/19 131 lb (59.4 kg)   03/21/19 133 lb (60.3 kg)     Body mass index is 23.38 kg/m². CBC:   Lab Results   Component Value Date    WBC 10.7 03/28/2019    RBC 4.89 03/28/2019    HGB 14.9 03/28/2019    HCT 44.6 03/28/2019    MCV 91.2 03/28/2019    RDW 14.1 03/28/2019     03/28/2019       CMP:   Lab Results   Component Value Date     03/28/2019    K 4.4 03/28/2019     03/28/2019    CO2 27 03/28/2019    BUN 21 03/28/2019    CREATININE 0.8 03/28/2019    GFRAA >60 03/28/2019    GFRAA >60 08/23/2011    AGRATIO 1.8 03/28/2019    LABGLOM >60 03/28/2019    GLUCOSE 105 03/28/2019    PROT 7.4 03/28/2019    PROT 7.3 08/23/2011    CALCIUM 10.0 03/28/2019    BILITOT 0.4 03/28/2019    ALKPHOS 71 03/28/2019    AST 23 03/28/2019    ALT 30 03/28/2019       POC Tests: No results for input(s): POCGLU, POCNA, POCK, POCCL, POCBUN, POCHEMO, POCHCT in the last 72 hours.     Coags: No results found for: PROTIME, INR, APTT    HCG (If Applicable): No results found for: PREGTESTUR, PREGSERUM, HCG, HCGQUANT     ABGs: No results found for: PHART, PO2ART, KLB1QZK, AZP3GAX, BEART, A7GCLSPE     Type & Screen (If Applicable):  No results found for: CAITLIN, [address]    Anesthesia Evaluation  Patient summary reviewed no history of anesthetic complications:   Airway: Mallampati: II  TM distance: >3 FB   Neck ROM: full  Mouth opening: > = 3 FB Dental:    (+) upper dentures      Pulmonary: breath sounds clear to auscultation  (+) current smoker    (-) COPD, asthma and recent URI                           Cardiovascular:  Exercise tolerance: good (>4 METS),       (-) pacemaker, hypertension, valvular problems/murmurs, past MI, CABG/stent, dysrhythmias,  angina and murmur      Rhythm: regular  Rate: normal           Beta Blocker:  Not on Beta Blocker         Neuro/Psych:               GI/Hepatic/Renal:   (+) renal disease: kidney stones, bowel prep,      (-) GERD       Endo/Other:        (-) diabetes mellitus, hypothyroidism               Abdominal:           Vascular:                                        Anesthesia Plan      TIVA     ASA 2       Induction: intravenous. Anesthetic plan and risks discussed with patient. Plan discussed with CRNA.                   Giovanny Jimenez MD   5/8/2019

## 2019-05-08 NOTE — PROCEDURES
[address]                               COLONOSCOPY REPORT    PATIENT NAME: Jodi Acharya                 :        1950  MED REC NO:   [id number]                          ROOM:  ACCOUNT NO:   [de-identified]                           ADMIT DATE: 2019  PROVIDER:     Alan Cordova MD    DATE OF PROCEDURE:  2019    REFERRING PROVIDER:  Jordan Keating MD    PATIENT HISTORY:  This is a 41-year-old female, outpatient. INSTRUMENT USED:  Olympus PCF-H180AL. MEDICATIONS OF PROCEDURE:  The patient was premedicated with Diprivan  intravenously as administered by the anesthesiology service. INDICATIONS:  The patient presents for colon cancer screening. She  believes that her last colonoscopy was in . She has a history of  recurrent diverticulitis with the last episode approximately three years  ago. DESCRIPTION OF PROCEDURE:  The digital and anal exams were normal.  The  colonoscope was inserted to the cecum. The prep was good to fair. Where the prep was fair, lesions such as small polyps or vascular  ectasias could have been missed. There was extensive sigmoid  diverticulosis. No mucosal lesions were identified. IMPRESSION:  Sigmoid diverticulosis only. PLAN:  Due to age, the patient will not require screening colonoscopies  in the future.         Evon Peres MD    D: 2019 10:38:40       T: 2019 11:03:57     [email or web address]  Job#: [id number]     Doc#: [id number]    CC:  Alan Cordova MD

## 2019-07-01 ENCOUNTER — TELEPHONE (OUTPATIENT)
Dept: INTERNAL MEDICINE CLINIC | Age: 69
End: 2019-07-01

## 2019-07-01 DIAGNOSIS — G44.89 OTHER HEADACHE SYNDROME: Primary | ICD-10-CM

## 2019-07-01 RX ORDER — DIAZEPAM 5 MG/1
5 TABLET ORAL DAILY PRN
Qty: 3 TABLET | Refills: 0 | Status: SHIPPED | OUTPATIENT
Start: 2019-07-01 | End: 2019-07-04

## 2019-07-10 ENCOUNTER — HOSPITAL ENCOUNTER (OUTPATIENT)
Dept: CT IMAGING | Age: 69
Discharge: HOME | End: 2019-07-10
Payer: MEDICARE

## 2019-07-10 ENCOUNTER — HOSPITAL ENCOUNTER (OUTPATIENT)
Age: 69
Discharge: HOME | End: 2019-07-10
Payer: MEDICARE

## 2019-07-10 ENCOUNTER — HOSPITAL ENCOUNTER (OUTPATIENT)
Dept: GENERAL RADIOLOGY | Age: 69
Discharge: HOME | End: 2019-07-10
Payer: MEDICARE

## 2019-07-10 DIAGNOSIS — G44.89 OTHER HEADACHE SYNDROME: ICD-10-CM

## 2019-07-10 PROCEDURE — 70450 CT HEAD/BRAIN W/O DYE: CPT

## 2019-07-10 PROCEDURE — 72040 X-RAY EXAM NECK SPINE 2-3 VW: CPT

## 2019-08-21 NOTE — ANESTHESIA POSTPROCEDURE EVALUATION
Department of Anesthesiology  Postprocedure Note    Patient: Zaid Hatch  MRN: [id number]  YOB: 1950  Date of evaluation: 5/8/2019  Time:  11:44 AM     Procedure Summary     Date:  05/08/19 Room / Location:  [facility] ENDO 03 / [facility] ENDOSCOPY    Anesthesia Start:  1599 Anesthesia Stop:  1024    Procedure:  COLONOSCOPY DIAGNOSTIC (N/A ) Diagnosis:       Diverticulitis      (HISTORY OF DIVERTICULITIS)    Surgeon:  Lorenzo Stafford MD Responsible Provider:  Raya Green MD    Anesthesia Type:  TIVA ASA Status:  2          Anesthesia Type: TIVA    Anil Phase I: Anil Score: 10    Anil Phase II: Anil Score: 10    Last vitals: Reviewed and per EMR flowsheets.        Anesthesia Post Evaluation    Patient location during evaluation: PACU  Patient participation: complete - patient participated  Level of consciousness: awake  Airway patency: patent  Nausea & Vomiting: no nausea  Complications: no  Cardiovascular status: hemodynamically stable  Respiratory status: acceptable  Hydration status: euvolemic PAST MEDICAL HISTORY:  Hypertension

## 2020-02-28 ENCOUNTER — TELEPHONE (OUTPATIENT)
Dept: INTERNAL MEDICINE CLINIC | Age: 70
End: 2020-02-28

## 2020-02-28 RX ORDER — SULFAMETHOXAZOLE AND TRIMETHOPRIM 800; 160 MG/1; MG/1
1 TABLET ORAL 2 TIMES DAILY
Qty: 20 TABLET | Refills: 0 | Status: SHIPPED | OUTPATIENT
Start: 2020-02-28 | End: 2020-03-09

## 2020-02-28 NOTE — TELEPHONE ENCOUNTER
Pt is going on a cruise in 2wks and she wants to know if there are any necessary precautions she should take before hand and does she need any vaccinations as well. Please contact pt with information.

## 2020-02-28 NOTE — TELEPHONE ENCOUNTER
Need to know where she's going? Spoke w/pt. Cruising to [place], [place], and [place]. Don't think she needs anything but wanted to run it by dr Foster Husbands.

## 2020-03-17 ENCOUNTER — TELEPHONE (OUTPATIENT)
Dept: FAMILY MEDICINE CLINIC | Age: 70
End: 2020-03-17

## 2020-03-17 NOTE — TELEPHONE ENCOUNTER
Pt just got off a cruise and flew home from [facility] and got a letter when she got home and was told to quarantine for 14 days. Pt feels like she is being discrimiated b/c her son got kicked out of a restaurant, she is needing to quarantine herself. Pt is wanting to get the COVID 19 test but she cant because she has no symptoms. Pt is very upset with this whole thing. Pt is wanting to know what she should do? Pl advise.    [phone]

## 2020-06-23 ENCOUNTER — OFFICE VISIT (OUTPATIENT)
Dept: FAMILY MEDICINE CLINIC | Age: 70
End: 2020-06-23
Payer: MEDICARE

## 2020-06-23 VITALS
TEMPERATURE: 97.2 F | OXYGEN SATURATION: 97 % | DIASTOLIC BLOOD PRESSURE: 80 MMHG | SYSTOLIC BLOOD PRESSURE: 132 MMHG | HEART RATE: 100 BPM | WEIGHT: 133 LBS | BODY MASS INDEX: 23.56 KG/M2

## 2020-06-23 DIAGNOSIS — E78.5 HYPERLIPIDEMIA, UNSPECIFIED HYPERLIPIDEMIA TYPE: ICD-10-CM

## 2020-06-23 LAB
A/G RATIO: 1.6 (ref 1.1–2.2)
ALBUMIN SERPL-MCNC: 4.4 G/DL (ref 3.4–5)
ALP BLD-CCNC: 64 U/L (ref 40–129)
ALT SERPL-CCNC: 15 U/L (ref 10–40)
ANION GAP SERPL CALCULATED.3IONS-SCNC: 16 MMOL/L (ref 3–16)
AST SERPL-CCNC: 19 U/L (ref 15–37)
BILIRUB SERPL-MCNC: 0.4 MG/DL (ref 0–1)
BILIRUBIN, POC: NORMAL
BLOOD URINE, POC: NORMAL
BUN BLDV-MCNC: 27 MG/DL (ref 7–20)
CALCIUM SERPL-MCNC: 9.4 MG/DL (ref 8.3–10.6)
CHLORIDE BLD-SCNC: 105 MMOL/L (ref 99–110)
CHOLESTEROL, TOTAL: 235 MG/DL (ref 0–199)
CLARITY, POC: CLEAR
CO2: 23 MMOL/L (ref 21–32)
COLOR, POC: YELLOW
CREAT SERPL-MCNC: 0.8 MG/DL (ref 0.6–1.2)
GFR AFRICAN AMERICAN: >60
GFR NON-AFRICAN AMERICAN: >60
GLOBULIN: 2.7 G/DL
GLUCOSE BLD-MCNC: 114 MG/DL (ref 70–99)
GLUCOSE URINE, POC: NORMAL
HCT VFR BLD CALC: 44.1 % (ref 36–48)
HDLC SERPL-MCNC: 46 MG/DL (ref 40–60)
HEMOGLOBIN: 14.6 G/DL (ref 12–16)
KETONES, POC: NORMAL
LDL CHOLESTEROL CALCULATED: 154 MG/DL
LEUKOCYTE EST, POC: NORMAL
MCH RBC QN AUTO: 30 PG (ref 26–34)
MCHC RBC AUTO-ENTMCNC: 33 G/DL (ref 31–36)
MCV RBC AUTO: 90.6 FL (ref 80–100)
NITRITE, POC: NORMAL
PDW BLD-RTO: 14.9 % (ref 12.4–15.4)
PH, POC: 6
PLATELET # BLD: 242 K/UL (ref 135–450)
PMV BLD AUTO: 8.9 FL (ref 5–10.5)
POTASSIUM SERPL-SCNC: 4.5 MMOL/L (ref 3.5–5.1)
PROTEIN, POC: NORMAL
RBC # BLD: 4.87 M/UL (ref 4–5.2)
SODIUM BLD-SCNC: 144 MMOL/L (ref 136–145)
SPECIFIC GRAVITY, POC: 1.03
TOTAL PROTEIN: 7.1 G/DL (ref 6.4–8.2)
TRIGL SERPL-MCNC: 176 MG/DL (ref 0–150)
TSH SERPL DL<=0.05 MIU/L-ACNC: 3.41 UIU/ML (ref 0.27–4.2)
UROBILINOGEN, POC: 0.2
VLDLC SERPL CALC-MCNC: 35 MG/DL
WBC # BLD: 6.9 K/UL (ref 4–11)

## 2020-06-23 PROCEDURE — G8400 PT W/DXA NO RESULTS DOC: HCPCS | Performed by: INTERNAL MEDICINE

## 2020-06-23 PROCEDURE — G8510 SCR DEP NEG, NO PLAN REQD: HCPCS | Performed by: INTERNAL MEDICINE

## 2020-06-23 PROCEDURE — G8427 DOCREV CUR MEDS BY ELIG CLIN: HCPCS | Performed by: INTERNAL MEDICINE

## 2020-06-23 PROCEDURE — 99214 OFFICE O/P EST MOD 30 MIN: CPT | Performed by: INTERNAL MEDICINE

## 2020-06-23 PROCEDURE — G8420 CALC BMI NORM PARAMETERS: HCPCS | Performed by: INTERNAL MEDICINE

## 2020-06-23 PROCEDURE — 1123F ACP DISCUSS/DSCN MKR DOCD: CPT | Performed by: INTERNAL MEDICINE

## 2020-06-23 PROCEDURE — 1090F PRES/ABSN URINE INCON ASSESS: CPT | Performed by: INTERNAL MEDICINE

## 2020-06-23 PROCEDURE — 4004F PT TOBACCO SCREEN RCVD TLK: CPT | Performed by: INTERNAL MEDICINE

## 2020-06-23 PROCEDURE — 3288F FALL RISK ASSESSMENT DOCD: CPT | Performed by: INTERNAL MEDICINE

## 2020-06-23 PROCEDURE — 81002 URINALYSIS NONAUTO W/O SCOPE: CPT | Performed by: INTERNAL MEDICINE

## 2020-06-23 PROCEDURE — 4040F PNEUMOC VAC/ADMIN/RCVD: CPT | Performed by: INTERNAL MEDICINE

## 2020-06-23 PROCEDURE — 3017F COLORECTAL CA SCREEN DOC REV: CPT | Performed by: INTERNAL MEDICINE

## 2020-06-23 RX ORDER — SULFAMETHOXAZOLE AND TRIMETHOPRIM 800; 160 MG/1; MG/1
1 TABLET ORAL 2 TIMES DAILY
Qty: 20 TABLET | Refills: 0 | Status: SHIPPED | OUTPATIENT
Start: 2020-06-23 | End: 2020-07-03

## 2020-06-23 ASSESSMENT — ENCOUNTER SYMPTOMS
SINUS PRESSURE: 0
STRIDOR: 0
COLOR CHANGE: 0
CHOKING: 0
BACK PAIN: 1
EYES NEGATIVE: 1
APNEA: 0
ABDOMINAL PAIN: 1
SHORTNESS OF BREATH: 1

## 2020-06-23 NOTE — PROGRESS NOTES
High Cholesterol      Relation: Father          Age of Onset: (Not Specified)  Problem: Heart Disease      Relation: Brother          Age of Onset: (Not Specified)  Problem: Cancer      Relation: Brother          Age of Onset: (Not Specified)          Comment: leukemia  Problem: High Cholesterol      Relation: Brother          Age of Onset: (Not Specified)  Problem: Heart Disease      Relation: Brother          Age of Onset: (Not Specified)  Problem: High Cholesterol      Relation: Brother          Age of Onset: (Not Specified)  Problem: Cancer      Relation: Brother          Age of Onset: (Not Specified)    Social History    Tobacco Use      Smoking status: Current Some Day Smoker        Packs/day: 0.25        Years: 30.00        Pack years: 7.5        Types: Cigarettes      Smokeless tobacco: Never Used      Tobacco comment: socially when drinking    Alcohol use: Yes      Comment: socially once weekly      Leg Pain    There was no injury mechanism. The pain is present in the right leg and left leg. The quality of the pain is described as aching. The pain is at a severity of 9/10. The pain is severe. Associated symptoms comments: walking. She reports no foreign bodies present. The symptoms are aggravated by movement. She has tried NSAIDs for the symptoms. The treatment provided no relief. Review of Systems   Constitutional: Negative. Negative for fatigue. HENT: Negative for ear pain, hearing loss and sinus pressure. Eyes: Negative. Respiratory: Positive for shortness of breath. Negative for apnea, choking and stridor. Cardiovascular: Negative. Gastrointestinal: Positive for abdominal pain. Genitourinary: Positive for difficulty urinating, frequency, hematuria and urgency. Musculoskeletal: Positive for arthralgias, back pain and neck pain. Skin: Negative. Negative for color change and pallor. Neurological: Positive for headaches. Negative for dizziness, tremors, seizures and syncope. Cervical: No cervical adenopathy. Skin:     General: Skin is warm and dry. Coloration: Skin is not pale. Findings: No erythema or rash. Neurological:      Mental Status: She is alert and oriented to person, place, and time. Cranial Nerves: No cranial nerve deficit. Motor: No abnormal muscle tone. Coordination: Coordination normal.      Deep Tendon Reflexes: Reflexes are normal and symmetric. Reflexes normal.   Psychiatric:         Behavior: Behavior normal.         Thought Content: Thought content normal.         Judgment: Judgment normal.         Assessment:      Encounter Diagnoses   Name Primary?  PAD (peripheral artery disease) ([facility]) Yes    Hyperlipidemia, unspecified hyperlipidemia type     Acute cystitis with hematuria     Tobacco abuse            Plan:      José Miguel Moreno was seen today for leg pain. Diagnoses and all orders for this visit:    [facility]  -     Ultrasound doppler arterial legs bilateral; Future    Hyperlipidemia, unspecified hyperlipidemia type  -     Comprehensive Metabolic Panel  -     CBC  -     Lipid Panel; Future  -     TSH without Reflex; Future  -     POCT Urinalysis no Micro    Acute cystitis with hematuria  -     sulfamethoxazole-trimethoprim (BACTRIM DS) 800-160 MG per tablet;  Take 1 tablet by mouth 2 times daily for 10 days    Tobacco abuse              Alycia Dai MD

## 2020-06-26 ENCOUNTER — HOSPITAL ENCOUNTER (OUTPATIENT)
Dept: VASCULAR LAB | Age: 70
Discharge: HOME | End: 2020-06-26
Payer: MEDICARE

## 2020-06-26 PROCEDURE — 93925 LOWER EXTREMITY STUDY: CPT

## 2020-06-29 ENCOUNTER — TELEPHONE (OUTPATIENT)
Dept: FAMILY MEDICINE CLINIC | Age: 70
End: 2020-06-29

## 2020-07-10 RX ORDER — PRAVASTATIN SODIUM 20 MG
TABLET ORAL
Qty: 30 TABLET | Refills: 0 | Status: SHIPPED | OUTPATIENT
Start: 2020-07-10 | End: 2020-08-13

## 2020-07-21 ENCOUNTER — INITIAL CONSULT (OUTPATIENT)
Dept: SURGERY | Age: 70
End: 2020-07-21
Payer: MEDICARE

## 2020-07-21 VITALS — DIASTOLIC BLOOD PRESSURE: 86 MMHG | SYSTOLIC BLOOD PRESSURE: 160 MMHG | BODY MASS INDEX: 23.74 KG/M2 | WEIGHT: 134 LBS

## 2020-07-21 PROBLEM — Z72.0 TOBACCO ABUSE: Status: ACTIVE | Noted: 2020-07-21

## 2020-07-21 PROBLEM — I73.9 CLAUDICATION IN PERIPHERAL VASCULAR DISEASE (HCC): Status: ACTIVE | Noted: 2020-07-21

## 2020-07-21 PROBLEM — R09.89 BRUIT OF RIGHT CAROTID ARTERY: Status: ACTIVE | Noted: 2020-07-21

## 2020-07-21 PROBLEM — I77.1 STENOSIS OF LEFT SUBCLAVIAN ARTERY (HCC): Status: ACTIVE | Noted: 2020-07-21

## 2020-07-21 PROCEDURE — 4040F PNEUMOC VAC/ADMIN/RCVD: CPT | Performed by: SURGERY

## 2020-07-21 PROCEDURE — G8420 CALC BMI NORM PARAMETERS: HCPCS | Performed by: SURGERY

## 2020-07-21 PROCEDURE — G8427 DOCREV CUR MEDS BY ELIG CLIN: HCPCS | Performed by: SURGERY

## 2020-07-21 PROCEDURE — G8400 PT W/DXA NO RESULTS DOC: HCPCS | Performed by: SURGERY

## 2020-07-21 PROCEDURE — 99204 OFFICE O/P NEW MOD 45 MIN: CPT | Performed by: SURGERY

## 2020-07-21 PROCEDURE — 1090F PRES/ABSN URINE INCON ASSESS: CPT | Performed by: SURGERY

## 2020-07-21 PROCEDURE — 1123F ACP DISCUSS/DSCN MKR DOCD: CPT | Performed by: SURGERY

## 2020-07-21 PROCEDURE — 4004F PT TOBACCO SCREEN RCVD TLK: CPT | Performed by: SURGERY

## 2020-07-21 PROCEDURE — 3017F COLORECTAL CA SCREEN DOC REV: CPT | Performed by: SURGERY

## 2020-07-21 ASSESSMENT — ENCOUNTER SYMPTOMS
ALLERGIC/IMMUNOLOGIC NEGATIVE: 1
RESPIRATORY NEGATIVE: 1
GASTROINTESTINAL NEGATIVE: 1

## 2020-07-21 NOTE — PROGRESS NOTES
Daily Progress Note   Donaldo Mahmood MD      7/21/2020    Chief Complaint   Patient presents with    Leg Pain     New patient. Ref by Dr. Eligio Coronel. Patient reports having bilateral leg pain. Right is the worst. Numb/cold at night. Patient reports 6/10 pain today from walking up from the parking lot. Patient is retired but is very active with her bed [place] breakfast in [place]. HISTORY OF PRESENT ILLNESS:                The patient is a 68 y.o. female who presents with a referral from Dr. Rogelio Mckenzie for PAD. Ms. Lin Curiel says she has a lot of pain at night from her legs, mainly the right leg, and mainly cramps. She also says her legs hurt when walking, but it's more in the front than the back of her leg. Mrs. Lin Curiel was walking two miles a day year round. Last fall, she had to stop doing this because of pain of her leg. She runs a bed and breakfast, and is very active. Despite this, she still smokes half a pack of cigarettes a day. Dr. Roxanna Lopez suggests she has a left subclavian artery stenosis as getting her blood pressure on this side is very difficult, as well as finding a radial pulse without the doppler. She complains of a cold right foot and leg. When she has pain in her leg, it is around the ankle and anterior portion of the leg she stops, and if there is nowhere to sit, she stands with no weight on the painful leg for up to 10 minutes until it stops hurting.        Past Medical History:   Diagnosis Date    Cancer [facility]) 1980    carcinoma in situ uterine    Chronic kidney disease 2010    rt side renal stone    Mixed hyperlipidemia 7/12/2010    Seasonal allergies     Vitamin D deficiency        Past Surgical History:   Procedure Laterality Date    APPENDECTOMY      BREAST SURGERY  1968    lumpectomy on right     CHOLECYSTECTOMY  1968    COLONOSCOPY  2007    diverticulitis    COLONOSCOPY N/A 5/8/2019    COLONOSCOPY DIAGNOSTIC performed by Lia Hylton MD at [address] HYSTERECTOMY      NECK SURGERY         Social History     Socioeconomic History    Marital status:      Spouse name: Not on file    Number of children: Not on file    Years of education: Not on file    Highest education level: Not on file   Occupational History    Not on file   Social Needs    Financial resource strain: Not on file    Food insecurity     Worry: Not on file     Inability: Not on file    Transportation needs     Medical: Not on file     Non-medical: Not on file   Tobacco Use    Smoking status: Current Some Day Smoker     Packs/day: 0.50     Years: 30.00     Pack years: 15.00     Types: Cigarettes    Smokeless tobacco: Never Used    Tobacco comment: socially when drinking   Substance and Sexual Activity    Alcohol use: Yes     Comment: socially once weekly    Drug use: No    Sexual activity: Not on file   Lifestyle    Physical activity     Days per week: Not on file     Minutes per session: Not on file    Stress: Not on file   Relationships    Social connections     Talks on phone: Not on file     Gets together: Not on file     Attends [employer] service: Not on file     Active member of club or organization: Not on file     Attends meetings of clubs or organizations: Not on file     Relationship status: Not on file    Intimate partner violence     Fear of current or ex partner: Not on file     Emotionally abused: Not on file     Physically abused: Not on file     Forced sexual activity: Not on file   Other Topics Concern    Not on file   Social History Narrative    Not on file       Family History   Problem Relation Age of Onset    Arthritis Mother     High Blood Pressure Mother     High Cholesterol Mother     Stroke Mother     Heart Disease Father     High Cholesterol Father     Heart Disease Brother     Cancer Brother         leukemia    High Cholesterol Brother     Heart Disease Brother     High Cholesterol Brother     Cancer Brother          Current Outpatient Medications:     pravastatin (PRAVACHOL) 20 MG tablet, TAKE ONE TABLET EVERY EVENING. STOP TAKING ATORVASTATIN, Disp: 30 tablet, Rfl: 0    Multiple Vitamins-Minerals (CENTRUM WOMEN PO), Take by mouth, Disp: , Rfl:     pitavastatin (LIVALO) 2 MG TABS tablet, Take 2 mg by mouth nightly, Disp: , Rfl:     Latex; Penicillins; Darvon [propoxyphene hcl]; and Levaquin [levofloxacin in d5w]    Vitals:    07/21/20 1359   BP: (!) 160/86   Weight: 134 lb (60.8 kg)       Orders Only on 06/23/2020   Component Date Value Ref Range Status    TSH 06/23/2020 3.41  0.27 - 4.20 uIU/mL Final    Cholesterol, Total 06/23/2020 235* 0 - 199 mg/dL Final    Triglycerides 06/23/2020 176* 0 - 150 mg/dL Final    HDL 06/23/2020 46  40 - 60 mg/dL Final    LDL Calculated 06/23/2020 154* <100 mg/dL Final    VLDL Cholesterol Calculated 06/23/2020 35  Not Established mg/dL Final       Review of Systems   Constitutional: Negative. HENT: Negative. Eyes: Positive for visual disturbance (wears glasses). Respiratory: Negative. Cardiovascular: Negative. Gastrointestinal: Negative. Endocrine: Negative. Genitourinary: Negative. Musculoskeletal: Negative. Skin: Negative. Allergic/Immunologic: Negative. Neurological: Negative. Hematological: Negative. Psychiatric/Behavioral: Negative. Physical Exam  Vitals signs and nursing note reviewed. Constitutional:       Appearance: Normal appearance. She is well-developed. HENT:      Mouth/Throat:      Pharynx: No oropharyngeal exudate. Eyes:      Conjunctiva/sclera: Conjunctivae normal.      Pupils: Pupils are equal, round, and reactive to light. Neck:      Musculoskeletal: Normal range of motion. Cardiovascular:      Rate and Rhythm: Normal rate and regular rhythm. Pulses:           Carotid pulses are 2+ on the right side with bruit and 2+ on the left side. Radial pulses are 2+ on the right side and 0 on the left side.         Femoral superficial femoral artery occlusion left side has greater than 50% stenosis in the same area but no occlusion strongly suggested that she quit smoking if she does not want to have this disease progress.'  . She also reported a strange weakness of her arms when she is folding sheets I guess it is possible she has left arm claudication    PLAN:  I explained options as far as possible getting through that SFA stenosis with a wire and and ballooning and stenting it a bypass or observation which is what I recommend and especially to quit smoking  Ms. [facility] will need to schedule a bilateral carotid duplex scan and office visit in three months to follow-up on her claudication. Dayanna Tinajero MA am scribing for and in the presence of Aarti Mccabe MD on this date of 07/21/20    I Bianca Branham MD personally performed the services described in this documentation as scribed by the Medical Assistant Luis Bucio in my presence and it is both accurate and complete.         Electronically signed by Aarti Mccabe MD on 7/21/2020 at 5:06 PM

## 2020-08-12 ENCOUNTER — TELEPHONE (OUTPATIENT)
Dept: FAMILY MEDICINE CLINIC | Age: 70
End: 2020-08-12

## 2020-08-13 RX ORDER — PRAVASTATIN SODIUM 20 MG
TABLET ORAL
Qty: 30 TABLET | Refills: 0 | Status: SHIPPED | OUTPATIENT
Start: 2020-08-13 | End: 2020-09-30

## 2020-08-13 RX ORDER — SULFAMETHOXAZOLE AND TRIMETHOPRIM 800; 160 MG/1; MG/1
1 TABLET ORAL 2 TIMES DAILY
Qty: 20 TABLET | Refills: 0 | Status: SHIPPED | OUTPATIENT
Start: 2020-08-13 | End: 2020-08-23

## 2020-08-13 RX ORDER — METRONIDAZOLE 500 MG/1
500 TABLET ORAL 3 TIMES DAILY
Qty: 30 TABLET | Refills: 0 | Status: SHIPPED | OUTPATIENT
Start: 2020-08-13 | End: 2020-08-23

## 2020-10-13 ENCOUNTER — TELEPHONE (OUTPATIENT)
Dept: FAMILY MEDICINE CLINIC | Age: 70
End: 2020-10-13

## 2020-10-13 RX ORDER — AZITHROMYCIN 250 MG/1
250 TABLET, FILM COATED ORAL SEE ADMIN INSTRUCTIONS
Qty: 6 TABLET | Refills: 0 | Status: SHIPPED | OUTPATIENT
Start: 2020-10-13 | End: 2020-10-18

## 2020-10-13 NOTE — TELEPHONE ENCOUNTER
Pt request rx for zpack   C/o mostly dry cough, did cough up little bit of clear sputum today, yellow nasal drainage, left ear pressure, and headaches x almost 1 week  Denies SOB/fever/wheezing    [facility] [place]

## 2020-10-20 ENCOUNTER — TELEPHONE (OUTPATIENT)
Dept: FAMILY MEDICINE CLINIC | Age: 70
End: 2020-10-20

## 2020-10-20 RX ORDER — AZITHROMYCIN 250 MG/1
TABLET, FILM COATED ORAL
Qty: 6 TABLET | Refills: 0 | Status: SHIPPED | OUTPATIENT
Start: 2020-10-20 | End: 2020-10-30

## 2020-10-20 NOTE — TELEPHONE ENCOUNTER
Pt called our office last week for sinus and ear, pt went and got tested - came back positive. Pt is needing to know what should she be doing from here? Pl advise.    [phone]

## 2020-10-20 NOTE — TELEPHONE ENCOUNTER
Pt tested for covid last Friday at [facility] in [place], it was positive. Was treated last week (by dr Denis Anand) with a zpack for sinus before she was tested. Advised tylenol or advil for fever and aches/pains. pt states her  has had sinus sx too but didn't get tested. Pt states she still has weakness, shakiness, some sinus drainage. No fever for couple days and is starting to get sense of smell and taste back. Req another round of zpack/abx for sinus sx. [facility] [place].

## 2020-11-10 ENCOUNTER — PROCEDURE VISIT (OUTPATIENT)
Dept: SURGERY | Age: 70
End: 2020-11-10
Payer: MEDICARE

## 2020-11-10 ENCOUNTER — OFFICE VISIT (OUTPATIENT)
Dept: SURGERY | Age: 70
End: 2020-11-10
Payer: MEDICARE

## 2020-11-10 VITALS
SYSTOLIC BLOOD PRESSURE: 118 MMHG | WEIGHT: 129 LBS | BODY MASS INDEX: 22.86 KG/M2 | DIASTOLIC BLOOD PRESSURE: 90 MMHG | HEIGHT: 63 IN

## 2020-11-10 PROBLEM — G45.8 SUBCLAVIAN STEAL SYNDROME OF LEFT SUBCLAVIAN ARTERY: Status: ACTIVE | Noted: 2020-11-10

## 2020-11-10 PROCEDURE — 1090F PRES/ABSN URINE INCON ASSESS: CPT | Performed by: SURGERY

## 2020-11-10 PROCEDURE — G8427 DOCREV CUR MEDS BY ELIG CLIN: HCPCS | Performed by: SURGERY

## 2020-11-10 PROCEDURE — 4004F PT TOBACCO SCREEN RCVD TLK: CPT | Performed by: SURGERY

## 2020-11-10 PROCEDURE — 93880 EXTRACRANIAL BILAT STUDY: CPT | Performed by: SURGERY

## 2020-11-10 PROCEDURE — G8420 CALC BMI NORM PARAMETERS: HCPCS | Performed by: SURGERY

## 2020-11-10 PROCEDURE — 4040F PNEUMOC VAC/ADMIN/RCVD: CPT | Performed by: SURGERY

## 2020-11-10 PROCEDURE — G8400 PT W/DXA NO RESULTS DOC: HCPCS | Performed by: SURGERY

## 2020-11-10 PROCEDURE — 3017F COLORECTAL CA SCREEN DOC REV: CPT | Performed by: SURGERY

## 2020-11-10 PROCEDURE — 1123F ACP DISCUSS/DSCN MKR DOCD: CPT | Performed by: SURGERY

## 2020-11-10 PROCEDURE — 99214 OFFICE O/P EST MOD 30 MIN: CPT | Performed by: SURGERY

## 2020-11-10 PROCEDURE — G8484 FLU IMMUNIZE NO ADMIN: HCPCS | Performed by: SURGERY

## 2020-11-10 NOTE — PROGRESS NOTES
Daily Progress Note   Cyndee Muller MD      11/10/2020    Chief Complaint   Patient presents with    Follow-up     3m with cds scan. pt states no complaints. did have covid beginning of october. HISTORY OF PRESENT ILLNESS:                The patient is a 65 y.o. female who presents with a 3 month follow up and carotid duplex scan. Mrs. ANMED [facility] says she can walk about 100 feet then has to stop because of calf pain. She says her foot then goes numb and it will take about 5-10 minutes to feel good enough to go again. She recently had Covid that she isolated for about a month  had it and isolated for 2 weeks. She has recovered symptomatically and she has since had a negative test.  She started with a sinus infection, then lost taste and smell and tested positive. Mrs. ANMED Man [facility] has a subclavian steal on the left.        Past Medical History:   Diagnosis Date    Cancer [facility]) 1980    carcinoma in situ uterine    Chronic kidney disease 2010    rt side renal stone    Mixed hyperlipidemia 7/12/2010    Seasonal allergies     Vitamin D deficiency        Past Surgical History:   Procedure Laterality Date    APPENDECTOMY      BREAST SURGERY  1968    lumpectomy on right     CHOLECYSTECTOMY  1968    COLONOSCOPY  2007    diverticulitis    COLONOSCOPY N/A 5/8/2019    COLONOSCOPY DIAGNOSTIC performed by Lucy Gan MD at [address] [facility] History     Socioeconomic History    Marital status:      Spouse name: Not on file    Number of children: Not on file    Years of education: Not on file    Highest education level: Not on file   Occupational History    Not on file   Social Needs    Financial resource strain: Not on file    Food insecurity     Worry: Not on file     Inability: Not on file    Transportation needs     Medical: Not on file     Non-medical: Not on file   Tobacco Use    Smoking status: Current Some Day Smoker Packs/day: 0.50     Years: 30.00     Pack years: 15.00     Types: Cigarettes    Smokeless tobacco: Never Used    Tobacco comment: socially when drinking   Substance and Sexual Activity    Alcohol use: Yes     Comment: socially once weekly    Drug use: No    Sexual activity: Not on file   Lifestyle    Physical activity     Days per week: Not on file     Minutes per session: Not on file    Stress: Not on file   Relationships    Social connections     Talks on phone: Not on file     Gets together: Not on file     Attends [employer] service: Not on file     Active member of club or organization: Not on file     Attends meetings of clubs or organizations: Not on file     Relationship status: Not on file    Intimate partner violence     Fear of current or ex partner: Not on file     Emotionally abused: Not on file     Physically abused: Not on file     Forced sexual activity: Not on file   Other Topics Concern    Not on file   Social History Narrative    Not on file       Family History   Problem Relation Age of Onset    Arthritis Mother     High Blood Pressure Mother     High Cholesterol Mother     Stroke Mother     Heart Disease Father     High Cholesterol Father     Heart Disease Brother     Cancer Brother         leukemia    High Cholesterol Brother     Heart Disease Brother     High Cholesterol Brother     Cancer Brother          Current Outpatient Medications:     pravastatin (PRAVACHOL) 20 MG tablet, TAKE ONE TABLET EVERY EVENING. STOP TAKING ATORVASTATIN, Disp: 90 tablet, Rfl: 3    Multiple Vitamins-Minerals (CENTRUM WOMEN PO), Take by mouth, Disp: , Rfl:     pitavastatin (LIVALO) 2 MG TABS tablet, Take 2 mg by mouth nightly, Disp: , Rfl:     Latex;  Penicillins; Darvon [propoxyphene hcl]; and Levaquin [levofloxacin in d5w]    Vitals:    11/10/20 1536 11/10/20 1537   BP: (!) 144/94 (!) 118/90   Site: Right Upper Arm Left Upper Arm   Weight: 129 lb (58.5 kg)    Height: 5' 3\" (1.6 m) Orders Only on 06/23/2020   Component Date Value Ref Range Status    TSH 06/23/2020 3.41  0.27 - 4.20 uIU/mL Final    Cholesterol, Total 06/23/2020 235* 0 - 199 mg/dL Final    Triglycerides 06/23/2020 176* 0 - 150 mg/dL Final    HDL 06/23/2020 46  40 - 60 mg/dL Final    LDL Calculated 06/23/2020 154* <100 mg/dL Final    VLDL Cholesterol Calculated 06/23/2020 35  Not Established mg/dL Final       Review of Systems    Physical Exam  Vitals signs and nursing note reviewed. Constitutional:       Appearance: Normal appearance. She is well-developed. HENT:      Mouth/Throat:      Pharynx: No oropharyngeal exudate. Eyes:      Conjunctiva/sclera: Conjunctivae normal.      Pupils: Pupils are equal, round, and reactive to light. Neck:      Musculoskeletal: Normal range of motion. Cardiovascular:      Rate and Rhythm: Normal rate and regular rhythm. Pulses:           Carotid pulses are 2+ on the right side with bruit and 2+ on the left side. Radial pulses are 2+ on the right side and 0 on the left side. Femoral pulses are 2+ on the right side and 2+ on the left side. Popliteal pulses are 1+ on the left side. Dorsalis pedis pulses are 1+ on the right side and 2+ on the left side. Posterior tibial pulses are 1+ on the left side. Heart sounds: Normal heart sounds. Comments:   Doppler 11/10/2020:  Rt DP: monophasic  Rt PT: monophasic (weak)  Rt AT:     Lt DP: Biphasic   Lt PT: monophasic (strong)  Lt AT:      Doppler 7/21/2020:  Rt DP: monophasic  Rt PT: monophasic (weak)  Rt AT:     Lt DP: biphasic  Lt PT:monophasic (strong)  Lt AT:  Lt. Radial: monophasic  Lt Ulnar: monophasic    Pulmonary:      Effort: Pulmonary effort is normal.      Breath sounds: Normal breath sounds. Chest:       Abdominal:      General: Bowel sounds are normal.       Musculoskeletal: Normal range of motion.    Neurological:      Mental Status: She is alert and oriented to person, place, and time. Deep Tendon Reflexes: Reflexes are normal and symmetric. Psychiatric:         Speech: Speech normal.         Behavior: Behavior normal.         Thought Content: Thought content normal.         Judgment: Judgment normal.           ASSESSMENT:    Problem List Items Addressed This Visit     Tobacco abuse    Subclavian steal syndrome of left subclavian artery    Stenosis of left subclavian artery (HCC)    Claudication in peripheral vascular disease (HCC) - Primary    Bruit of right carotid artery          PLAN:  She has made great progress on her smoking she is down to 2 cigarettes a day    Explained that her subclavian steal will probably not bother her she does not have any real symptoms in the upper arm nor any dizziness brought on by arm exercise    Dr. Jeronimo Toro will talk with Dr. Homero Rios about having an angiogram done he has significant claudication at 100 feet says it does not get better for up to 10 minutes, duplex shows a mid superficial femoral artery stenosis in 50% and a distal superficial femoral artery occlusion an MITCHELL of 0.62 explained that she will need an angiogram probably going to the left groin to get an aortogram and up and over to work on the superficial femoral artery but will leave this up to Dr. Angelo Chow. Her last creatinine was normal in June he does have a history of kidney stones and some blood in the urine but never had kidney failure  You will be called to discuss surgery dates and times. Please schedule to follow up with a bilateral carotid duplex scan and office visit in one year. Maty Moss MA am scribing for and in the presence of Cheri Ambrosio MD on this date of 11/10/20    I Ana Garcia MD personally performed the services described in this documentation as scribed by the Medical Assistant Cj Bucio in my presence and it is both accurate and complete.         Electronically signed by Cheri Ambrosio MD on 11/10/2020 at 5:18 PM

## 2020-11-10 NOTE — PATIENT INSTRUCTIONS
Dr. Alyssa Walsh will talk with Dr. Giovanni Spear about having an angiogram done. You will be called to discuss surgery dates and times. Please schedule to follow up with a bilateral carotid duplex scan and office visit in one year.

## 2020-11-12 ENCOUNTER — TELEPHONE (OUTPATIENT)
Dept: SURGERY | Age: 70
End: 2020-11-12

## 2020-11-12 NOTE — LETTER
[facility]  Cardiac Cath Lab    Phone [phone] Fax: [phone]          Patient Scheduling Form:     Date: 2020  Time: 7:30   ( Arrival 6:30)    Procedure: Angiogram of the Aorta and Right Lower Extremity, Left Radial, Possible Left Femoral, Possible Revascularization    Anesthesia Required:  LOC/SED    Physician: Nitesh Conde DO  -------------------------------------------------------------------------------------------------------------------  Patients Name: Martha Canales    YOB: 1950 Sex:  F    Patient Social Security #:     Mailing Address:  [place] [id number]     Home Phone #  [phone]  Alternate #  [phone]    Primary Care Physician: Royal Kirt MD    Diagnosis / ICD-10 Code: I73.9 - Peripheral Vascular Disease   ------------------------------------------------------------------------------------------------------------------  INSURANCE INFORMAITON:  Payor/Plan Subscr  Sex Relation Sub. Ins. ID Effective Group Num   1. MEDICARE - MECarmelita Soles*  Female  [id number] 1/1/15                                    [address]  [address] 1949 Male  [id number] 1/1/15 [id number]                                   [address]       Form Completed Lily Tilley             [id number]           [id number]

## 2020-11-16 ENCOUNTER — TELEPHONE (OUTPATIENT)
Dept: FAMILY MEDICINE CLINIC | Age: 70
End: 2020-11-16

## 2020-11-16 NOTE — TELEPHONE ENCOUNTER
Pt states she's had chest tightness, heart flutters, legs feel weak and shaky, couple days. No SOB, congestion. Want to speak to Dr Lnyn Harp. [phone].

## 2020-11-17 NOTE — PATIENT INSTRUCTIONS
Patient Education        Stopping Smoking: Care Instructions  Your Care Instructions     Cigarette smokers crave the nicotine in cigarettes. Giving it up is much harder than simply changing a habit. Your body has to stop craving the nicotine. It is hard to quit, but you can do it. There are many tools that people use to quit smoking. You may find that combining tools works best for you. There are several steps to quitting. First you get ready to quit. Then you get support to help you. After that, you learn new skills and behaviors to become a nonsmoker. For many people, a necessary step is getting and using medicine. Your doctor will help you set up the plan that best meets your needs. You may want to attend a smoking cessation program to help you quit smoking. When you choose a program, look for one that has proven success. Ask your doctor for ideas. You will greatly increase your chances of success if you take medicine as well as get counseling or join a cessation program.  Some of the changes you feel when you first quit tobacco are uncomfortable. Your body will miss the nicotine at first, and you may feel short-tempered and grumpy. You may have trouble sleeping or concentrating. Medicine can help you deal with these symptoms. You may struggle with changing your smoking habits and rituals. The last step is the tricky one: Be prepared for the smoking urge to continue for a time. This is a lot to deal with, but keep at it. You will feel better. Follow-up care is a key part of your treatment and safety. Be sure to make and go to all appointments, and call your doctor if you are having problems. It's also a good idea to know your test results and keep a list of the medicines you take. How can you care for yourself at home? · Ask your family, friends, and coworkers for support. You have a better chance of quitting if you have help and support.   · Join a support group, such as Nicotine Anonymous, for people who are trying to quit smoking. · Consider signing up for a smoking cessation program, such as the American Lung Association's Freedom from Smoking program.  · Get text messaging support. Go to the website at www.smokefree. gov to sign up for the [facility] program.  · Set a quit date. Pick your date carefully so that it is not right in the middle of a big deadline or stressful time. Once you quit, do not even take a puff. Get rid of all ashtrays and lighters after your last cigarette. Clean your house and your clothes so that they do not smell of smoke. · Learn how to be a nonsmoker. Think about ways you can avoid those things that make you reach for a cigarette. ? Avoid situations that put you at greatest risk for smoking. For some people, it is hard to have a drink with friends without smoking. For others, they might skip a coffee break with coworkers who smoke. ? Change your daily routine. Take a different route to work or eat a meal in a different place. · Cut down on stress. Calm yourself or release tension by doing an activity you enjoy, such as reading a book, taking a hot bath, or gardening. · Talk to your doctor or pharmacist about nicotine replacement therapy, which replaces the nicotine in your body. You still get nicotine but you do not use tobacco. Nicotine replacement products help you slowly reduce the amount of nicotine you need. These products come in several forms, many of them available over-the-counter:  ? Nicotine patches  ? Nicotine gum and lozenges  ? Nicotine inhaler  · Ask your doctor about bupropion (Wellbutrin) or varenicline (Chantix), which are prescription medicines. They do not contain nicotine. They help you by reducing withdrawal symptoms, such as stress and anxiety. · Some people find hypnosis, acupuncture, and massage helpful for ending the smoking habit. · Eat a healthy diet and get regular exercise.  Having healthy habits will help your body move past its craving for nicotine. · Be prepared to keep trying. Most people are not successful the first few times they try to quit. Do not get mad at yourself if you smoke again. Make a list of things you learned and think about when you want to try again, such as next week, next month, or next year. Where can you learn more? Go to https://Plazapoints (Cuponium)pegraceewdiaz.Promon. org and sign in to your [employer] account. Enter Y878 in the Biexdiao.[employer] box to learn more about \"Stopping Smoking: Care Instructions. \"     If you do not have an account, please click on the \"Sign Up Now\" link. Current as of: March 12, 2020               Content Version: 12.6  © 2006-2020 [employer]. Care instructions adapted under license by [facility] ([facility]). If you have questions about a medical condition or this instruction, always ask your healthcare professional. [address] any warranty or liability for your use of this information.

## 2020-11-17 NOTE — PROGRESS NOTES
[address]  51/0/[address]    November 18, 2020    Referring Physician: Sin Wyman MD  Reason for Referral: Chest pain     CC: \"I'm having chest pains. \"     HPI:  The patient is 65 y.o. female with a past medical history significant for carotid/subclavian stenosis, peripheral arterial disease, nicotine addiction and hyperlipidemia who presents today for evaluation of nonexertional chest pain. She reports a constant mid sternal chest discomfort that started last week. She describes the sensation as a \"tightness\" or a \"fullness\" in her chest. She denies any exertional chest pains or exertional worsening the continuous pain over the last week. She has chronic CLARK but denies worsening with the chest pains. She continues to smoke and states she has never been diagnosed with COPD. She states she was positive for Covid-19 10/16/20 and reports chest congestion, headache, and a nonproductive cough since the diagnosis. She states she is following with Dr. Rafael Wilburn for her vascular issues and was referred to Dr. Grover Salinas for a peripheral angiogram. She reports compliance with her medications and tolerating. She report myalgias with taking Lipitor but seems to be tolerating the pravastatin. Patient denies typical sounding exertional chest pain/pressure, dyspnea at rest, worsening CLARK, PND, orthopnea, palpitations, lightheadedness, weight changes, changes in LE edema, and syncope.     Past Medical History:   Diagnosis Date    Cancer [facility]) 1980    carcinoma in situ uterine    Chronic kidney disease 2010    rt side renal stone    Mixed hyperlipidemia 7/12/2010    Seasonal allergies     Vitamin D deficiency      Past Surgical History:   Procedure Laterality Date    APPENDECTOMY      BREAST SURGERY  1968    lumpectomy on right     CHOLECYSTECTOMY  1968    COLONOSCOPY  2007    diverticulitis    COLONOSCOPY N/A 5/8/2019    COLONOSCOPY DIAGNOSTIC performed by Marcos Kussmaul MD Emre at [facility]       Family History   Problem Relation Age of Onset    Arthritis Mother     High Blood Pressure Mother     High Cholesterol Mother     Stroke Mother     Heart Disease Father     High Cholesterol Father     Heart Disease Brother     Cancer Brother         leukemia    High Cholesterol Brother     Heart Disease Brother     High Cholesterol Brother     Cancer Brother      Social History     Tobacco Use    Smoking status: Current Some Day Smoker     Packs/day: 0.50     Years: 30.00     Pack years: 15.00     Types: Cigarettes    Smokeless tobacco: Never Used    Tobacco comment: socially when drinking   Substance Use Topics    Alcohol use: Yes     Comment: socially once weekly    Drug use: No       Allergies   Allergen Reactions    Latex Hives    Penicillins Anaphylaxis and Hives    Darvon [Propoxyphene Hcl] Itching    Levaquin [Levofloxacin In D5w] Other (See Comments)     Muscle pain. Current Outpatient Medications   Medication Sig Dispense Refill    pravastatin (PRAVACHOL) 20 MG tablet TAKE ONE TABLET EVERY EVENING. STOP TAKING ATORVASTATIN 90 tablet 3    Multiple Vitamins-Minerals (CENTRUM WOMEN PO) Take by mouth      pitavastatin (LIVALO) 2 MG TABS tablet Take 2 mg by mouth nightly       No current facility-administered medications for this visit. Review of Systems:  · Constitutional: No unanticipated weight loss. There's been no change in energy level, sleep pattern, or activity level. No fevers, chills. · Eyes: No visual changes or diplopia. No scleral icterus. · ENT: No Headaches, hearing loss or vertigo. No mouth sores or sore throat. · Cardiovascular: as reviewed in HPI  · Respiratory: No cough or wheezing, no sputum production. No hemoptysis. · Gastrointestinal: No abdominal pain, appetite loss, blood in stools. No change in bowel or bladder habits.   · Genitourinary: No dysuria, trouble voiding, or hematuria. · Musculoskeletal:  No gait disturbance, no joint complaints. · Integumentary: No rash or pruritis. · Neurological: No headache, diplopia, change in muscle strength, numbness or tingling. · Psychiatric: No anxiety or depression. · Endocrine: No temperature intolerance. No excessive thirst, fluid intake, or urination. No tremor. · Hematologic/Lymphatic: No abnormal bruising or bleeding, blood clots or swollen lymph nodes. · Allergic/Immunologic: No nasal congestion or hives. Physical Exam:   /74 (Site: Right Upper Arm, Position: Supine, Cuff Size: Large Adult)   Pulse 87   Temp 97.9 °F (36.6 °C)   Ht 5' 3\" (1.6 m)   Wt 130 lb (59 kg) Comment: with shoes  SpO2 98%   BMI 23.03 kg/m²   Wt Readings from Last 3 Encounters:   11/18/20 130 lb (59 kg)   11/10/20 129 lb (58.5 kg)   07/21/20 134 lb (60.8 kg)     Constitutional: She is oriented to person, place, and time. She appears well-developed and well-nourished. In no acute distress. Head: Normocephalic and atraumatic. Pupils equal and round. Neck: Neck supple. No JVP or carotid bruit appreciated. No mass and no thyromegaly present. No lymphadenopathy present. Cardiovascular: Normal rate. Normal heart sounds. Exam reveals no gallop and no friction rub. No murmur heard. Pulmonary/Chest: Effort normal and breath sounds normal. No respiratory distress. She has no wheezes, rhonchi or rales. Abdominal: Soft, non-tender. Bowel sounds are normal. She exhibits no organomegaly, mass or bruit. Extremities: No edema. No cyanosis or clubbing. Pulses are 2+ radial/carotid bilaterally. Neurological: No gross cranial nerve deficit. Coordination normal.   Skin: Skin is warm and dry. There is no rash or diaphoresis. Psychiatric: She has a normal mood and affect.  Her speech is normal and behavior is normal.     Lab Review:   FLP:    Lab Results   Component Value Date    TRIG 176 06/23/2020    HDL 46 06/23/2020    HDL 61 08/23/2011    LDLCALC 154 06/23/2020    LABVLDL 35 06/23/2020     BUN/Creatinine:    Lab Results   Component Value Date    BUN 27 06/23/2020    CREATININE 0.8 06/23/2020     EKG Interpretation: Sinus rhythm with PAC. Nonspecific ST abnormality. Low voltage. Image Review:   Echo 8/2012  Normal EF, no valvular abnormalities     Stress test 8/1/12  Normal EF, no stress induced ischemia. Holter 8/1/12  Normal sinus rhythm    ABIs 6/26/20   Abnormal R MITCHELL in the range of claudication. Normal L MITCHELL at rest.    Occluded distal R SFA with popliteal and tibial reconstitution. Left SFA stenosis measuring greater than 50%      Carotids 11/10/20   Approximately 30% stenosis of the right mid/distal CCA. 16-49% stenosis of the right ICA based on velocity criteria and B-Mode    imaging. RAMSEY stenosis measures 19% by image. Right ECA stenosis. Right vertebral artery demonstrates antegrade flow. Right subclavian artery demonstrates biphasic flow. 16-49% stenosis of the left ICA based on velocity criteria and B-Mode    imaging. LICA stenosis measures 25% by image. Left ECA stenosis. Monophasic flow left subclavian artery and significant brachial systolic    pressure gradient consistent with significant stenosis of the left proximal    subclavian artery. Retrograde flow left vertebral artery consistent with subclavian steal.     Assessment/Plan:   1) Atypical chest pain. Likely noncardiac based on description as she has continuous unchanged pressure for an entire week. She likely has CAD but seems unlikely the CAD is causing this pain. Denies any exertional chest pains. No plans for stress testing at this time. Advised to call if he has exertional chest pain. May be related to Covid-19 infection in October. Will order a CXR. 2) PAD/carotid stenosis/subclavian stenosis. Following with Vascular. Would recommend switching pravastatin to a high intensity statin. Reports myalgias with taking Lipitor.  Will discontinue pravastatin and initiate Crestor 20 mg daily. 3) Hyperlipidemia. Cholesterol is not well controlled. Would recommend switching pravastatin to a high intensity statin. Reports myalgias with taking Lipitor. Will discontinue pravastatin and initiate Crestor 20 mg daily. 4) Nicotine Addiction. Encouraged smoking cessation but patient is in the contemplative stage. Follow up on an as needed basis. Thank you very much for allowing me to participate in the care of your patient. Please do not hesitate to contact me if you have any questions. Sincerely,  Tobias James. Javi Galvez, [address] [place] [address]  Ph: [phone]  Fax: [phone]    This note was scribed in the presence of Dr Javi Galvez MD by Shikha Apodaca RN. Physician Attestation: The scribes documentation has been prepared under my direction and personally reviewed by me in its entirety. I confirm that the note above accurately reflects all work, treatment, procedures, and medical decision making performed by me. All portions of the note including but not limited to the chief complaint, history of present illness, physical exam, assessment and plan/medical decision making were personally reviewed, edited, and updated on the day of the visit.

## 2020-11-18 ENCOUNTER — OFFICE VISIT (OUTPATIENT)
Dept: CARDIOLOGY CLINIC | Age: 70
End: 2020-11-18
Payer: MEDICARE

## 2020-11-18 ENCOUNTER — HOSPITAL ENCOUNTER (OUTPATIENT)
Age: 70
Discharge: HOME | End: 2020-11-18
Payer: MEDICARE

## 2020-11-18 ENCOUNTER — HOSPITAL ENCOUNTER (OUTPATIENT)
Dept: GENERAL RADIOLOGY | Age: 70
Discharge: HOME | End: 2020-11-18
Payer: MEDICARE

## 2020-11-18 VITALS
HEIGHT: 63 IN | DIASTOLIC BLOOD PRESSURE: 74 MMHG | WEIGHT: 130 LBS | OXYGEN SATURATION: 98 % | HEART RATE: 87 BPM | TEMPERATURE: 97.9 F | SYSTOLIC BLOOD PRESSURE: 138 MMHG | BODY MASS INDEX: 23.04 KG/M2

## 2020-11-18 PROCEDURE — 1090F PRES/ABSN URINE INCON ASSESS: CPT | Performed by: INTERNAL MEDICINE

## 2020-11-18 PROCEDURE — 4004F PT TOBACCO SCREEN RCVD TLK: CPT | Performed by: INTERNAL MEDICINE

## 2020-11-18 PROCEDURE — G8420 CALC BMI NORM PARAMETERS: HCPCS | Performed by: INTERNAL MEDICINE

## 2020-11-18 PROCEDURE — 3017F COLORECTAL CA SCREEN DOC REV: CPT | Performed by: INTERNAL MEDICINE

## 2020-11-18 PROCEDURE — 99204 OFFICE O/P NEW MOD 45 MIN: CPT | Performed by: INTERNAL MEDICINE

## 2020-11-18 PROCEDURE — 71046 X-RAY EXAM CHEST 2 VIEWS: CPT

## 2020-11-18 PROCEDURE — 4040F PNEUMOC VAC/ADMIN/RCVD: CPT | Performed by: INTERNAL MEDICINE

## 2020-11-18 PROCEDURE — G8427 DOCREV CUR MEDS BY ELIG CLIN: HCPCS | Performed by: INTERNAL MEDICINE

## 2020-11-18 PROCEDURE — G8400 PT W/DXA NO RESULTS DOC: HCPCS | Performed by: INTERNAL MEDICINE

## 2020-11-18 PROCEDURE — 93000 ELECTROCARDIOGRAM COMPLETE: CPT | Performed by: INTERNAL MEDICINE

## 2020-11-18 PROCEDURE — 1123F ACP DISCUSS/DSCN MKR DOCD: CPT | Performed by: INTERNAL MEDICINE

## 2020-11-18 PROCEDURE — G8484 FLU IMMUNIZE NO ADMIN: HCPCS | Performed by: INTERNAL MEDICINE

## 2020-11-18 RX ORDER — ASPIRIN 81 MG/1
81 TABLET ORAL DAILY
COMMUNITY

## 2020-11-18 RX ORDER — ROSUVASTATIN CALCIUM 20 MG/1
20 TABLET, COATED ORAL DAILY
Qty: 90 TABLET | Refills: 1 | Status: SHIPPED | OUTPATIENT
Start: 2020-11-18

## 2020-11-30 ENCOUNTER — TELEPHONE (OUTPATIENT)
Dept: FAMILY MEDICINE CLINIC | Age: 70
End: 2020-11-30

## 2020-11-30 RX ORDER — METHYLPREDNISOLONE 4 MG/1
TABLET ORAL
Qty: 1 KIT | Refills: 0 | Status: SHIPPED | OUTPATIENT
Start: 2020-11-30 | End: 2020-12-06

## 2020-11-30 NOTE — TELEPHONE ENCOUNTER
Pt has been thru 2 rounds of zpa and she is still having issues w/ her ear. Her left ear is still hurting and clogged. Would like to know what is the next step?   Pl advise    [phone]

## 2020-12-02 ENCOUNTER — TELEPHONE (OUTPATIENT)
Dept: SURGERY | Age: 70
End: 2020-12-02

## 2020-12-02 NOTE — TELEPHONE ENCOUNTER
Spoke to [facility] he states its fine she take those medications, he is aware of her nickel allergy. Patient advised.

## 2020-12-03 ENCOUNTER — TELEPHONE (OUTPATIENT)
Dept: SURGERY | Age: 70
End: 2020-12-03

## 2020-12-04 ENCOUNTER — TELEPHONE (OUTPATIENT)
Dept: FAMILY MEDICINE CLINIC | Age: 70
End: 2020-12-04

## 2020-12-04 ENCOUNTER — TELEPHONE (OUTPATIENT)
Dept: SURGERY | Age: 70
End: 2020-12-04

## 2020-12-04 NOTE — TELEPHONE ENCOUNTER
Pt is scheduled for an angigram for Wednesday     Pt was told by scheduling that pt is needing to get blood work done     CBC   BMP     Pl advise.    [phone]

## 2020-12-07 DIAGNOSIS — I73.9 PAD (PERIPHERAL ARTERY DISEASE) (HCC): ICD-10-CM

## 2020-12-07 LAB
HCT VFR BLD CALC: 40.8 % (ref 36–48)
HEMOGLOBIN: 13.6 G/DL (ref 12–16)
MCH RBC QN AUTO: 29.6 PG (ref 26–34)
MCHC RBC AUTO-ENTMCNC: 33.3 G/DL (ref 31–36)
MCV RBC AUTO: 89 FL (ref 80–100)
PDW BLD-RTO: 14.4 % (ref 12.4–15.4)
PLATELET # BLD: 247 K/UL (ref 135–450)
PMV BLD AUTO: 8.5 FL (ref 5–10.5)
RBC # BLD: 4.59 M/UL (ref 4–5.2)
WBC # BLD: 12.7 K/UL (ref 4–11)

## 2020-12-08 LAB
ANION GAP SERPL CALCULATED.3IONS-SCNC: 11 MMOL/L (ref 3–16)
BUN BLDV-MCNC: 26 MG/DL (ref 7–20)
CALCIUM SERPL-MCNC: 9.2 MG/DL (ref 8.3–10.6)
CHLORIDE BLD-SCNC: 105 MMOL/L (ref 99–110)
CO2: 25 MMOL/L (ref 21–32)
CREAT SERPL-MCNC: 0.7 MG/DL (ref 0.6–1.2)
GFR AFRICAN AMERICAN: >60
GFR NON-AFRICAN AMERICAN: >60
GLUCOSE BLD-MCNC: 89 MG/DL (ref 70–99)
POTASSIUM SERPL-SCNC: 4.5 MMOL/L (ref 3.5–5.1)
SODIUM BLD-SCNC: 141 MMOL/L (ref 136–145)

## 2020-12-09 ENCOUNTER — HOSPITAL ENCOUNTER (OUTPATIENT)
Dept: CARDIAC CATH/INVASIVE PROCEDURES | Age: 70
Discharge: HOME | End: 2020-12-09
Payer: MEDICARE

## 2020-12-09 VITALS
RESPIRATION RATE: 14 BRPM | HEIGHT: 63 IN | WEIGHT: 129 LBS | DIASTOLIC BLOOD PRESSURE: 81 MMHG | HEART RATE: 78 BPM | TEMPERATURE: 97.7 F | BODY MASS INDEX: 22.86 KG/M2 | SYSTOLIC BLOOD PRESSURE: 166 MMHG | OXYGEN SATURATION: 100 %

## 2020-12-09 PROCEDURE — C1769 GUIDE WIRE: HCPCS

## 2020-12-09 PROCEDURE — C1887 CATHETER, GUIDING: HCPCS

## 2020-12-09 PROCEDURE — C1725 CATH, TRANSLUMIN NON-LASER: HCPCS

## 2020-12-09 PROCEDURE — 99152 MOD SED SAME PHYS/QHP 5/>YRS: CPT | Performed by: STUDENT IN AN ORGANIZED HEALTH CARE EDUCATION/TRAINING PROGRAM

## 2020-12-09 PROCEDURE — C2623 CATH, TRANSLUMIN, DRUG-COAT: HCPCS

## 2020-12-09 PROCEDURE — 6370000000 HC RX 637 (ALT 250 FOR IP)

## 2020-12-09 PROCEDURE — 75710 ARTERY X-RAYS ARM/LEG: CPT | Performed by: STUDENT IN AN ORGANIZED HEALTH CARE EDUCATION/TRAINING PROGRAM

## 2020-12-09 PROCEDURE — 75625 CONTRAST EXAM ABDOMINL AORTA: CPT | Performed by: STUDENT IN AN ORGANIZED HEALTH CARE EDUCATION/TRAINING PROGRAM

## 2020-12-09 PROCEDURE — 75625 CONTRAST EXAM ABDOMINL AORTA: CPT

## 2020-12-09 PROCEDURE — 37224 PR REVSC OPN/PRG FEM/POP W/ANGIOPLASTY UNI: CPT | Performed by: STUDENT IN AN ORGANIZED HEALTH CARE EDUCATION/TRAINING PROGRAM

## 2020-12-09 PROCEDURE — 75710 ARTERY X-RAYS ARM/LEG: CPT

## 2020-12-09 PROCEDURE — 2709999900 HC NON-CHARGEABLE SUPPLY

## 2020-12-09 PROCEDURE — 6360000004 HC RX CONTRAST MEDICATION: Performed by: STUDENT IN AN ORGANIZED HEALTH CARE EDUCATION/TRAINING PROGRAM

## 2020-12-09 PROCEDURE — C1894 INTRO/SHEATH, NON-LASER: HCPCS

## 2020-12-09 PROCEDURE — 99152 MOD SED SAME PHYS/QHP 5/>YRS: CPT

## 2020-12-09 PROCEDURE — C1760 CLOSURE DEV, VASC: HCPCS

## 2020-12-09 PROCEDURE — 6360000002 HC RX W HCPCS

## 2020-12-09 PROCEDURE — 2580000003 HC RX 258

## 2020-12-09 PROCEDURE — 99153 MOD SED SAME PHYS/QHP EA: CPT

## 2020-12-09 PROCEDURE — 76937 US GUIDE VASCULAR ACCESS: CPT | Performed by: STUDENT IN AN ORGANIZED HEALTH CARE EDUCATION/TRAINING PROGRAM

## 2020-12-09 PROCEDURE — 75774 ARTERY X-RAY EACH VESSEL: CPT

## 2020-12-09 PROCEDURE — 2500000003 HC RX 250 WO HCPCS

## 2020-12-09 PROCEDURE — 37224 HC FEM POP TERRITORY PLASTY: CPT

## 2020-12-09 RX ORDER — SODIUM CHLORIDE 0.9 % (FLUSH) 0.9 %
10 SYRINGE (ML) INJECTION EVERY 12 HOURS SCHEDULED
Status: DISCONTINUED | OUTPATIENT
Start: 2020-12-09 | End: 2020-12-09 | Stop reason: SDUPTHER

## 2020-12-09 RX ORDER — SODIUM CHLORIDE 0.9 % (FLUSH) 0.9 %
10 SYRINGE (ML) INJECTION PRN
Status: DISCONTINUED | OUTPATIENT
Start: 2020-12-09 | End: 2020-12-09 | Stop reason: SDUPTHER

## 2020-12-09 RX ORDER — CLOPIDOGREL BISULFATE 75 MG/1
75 TABLET ORAL DAILY
Qty: 30 TABLET | Refills: 3 | Status: SHIPPED | OUTPATIENT
Start: 2020-12-09 | End: 2021-04-12

## 2020-12-09 RX ORDER — ACETAMINOPHEN 325 MG/1
650 TABLET ORAL EVERY 4 HOURS PRN
Status: DISCONTINUED | OUTPATIENT
Start: 2020-12-09 | End: 2020-12-10 | Stop reason: HOSPADM

## 2020-12-09 RX ORDER — CLOPIDOGREL BISULFATE 75 MG/1
150 TABLET ORAL ONCE
Status: DISCONTINUED | OUTPATIENT
Start: 2020-12-09 | End: 2020-12-10 | Stop reason: HOSPADM

## 2020-12-09 RX ORDER — SODIUM CHLORIDE 0.9 % (FLUSH) 0.9 %
10 SYRINGE (ML) INJECTION PRN
Status: DISCONTINUED | OUTPATIENT
Start: 2020-12-09 | End: 2020-12-10 | Stop reason: HOSPADM

## 2020-12-09 RX ORDER — SODIUM CHLORIDE 0.9 % (FLUSH) 0.9 %
10 SYRINGE (ML) INJECTION EVERY 12 HOURS SCHEDULED
Status: DISCONTINUED | OUTPATIENT
Start: 2020-12-09 | End: 2020-12-10 | Stop reason: HOSPADM

## 2020-12-09 RX ORDER — SODIUM CHLORIDE 9 MG/ML
INJECTION, SOLUTION INTRAVENOUS CONTINUOUS
Status: DISCONTINUED | OUTPATIENT
Start: 2020-12-09 | End: 2020-12-10 | Stop reason: HOSPADM

## 2020-12-09 RX ADMIN — IOPAMIDOL 115 ML: 755 INJECTION, SOLUTION INTRAVENOUS at 08:27

## 2020-12-09 NOTE — OP NOTE
[address] [place] [address]                                OPERATIVE REPORT    PATIENT NAME: Fantasma Mckeon                 :        1950  MED REC NO:   [id number]                          ROOM:  ACCOUNT NO:   [de-identified]                           ADMIT DATE: 2020  PROVIDER:     Emanuel Ramirez DO    DATE OF PROCEDURE:  2020    PREOPERATIVE DIAGNOSIS:  Lifestyle-limiting claudication. POSTOPERATIVE DIAGNOSIS:  Lifestyle-limiting claudication. OPERATION PERFORMED:  1. Ultrasound-guided access to the left common femoral artery. The  vessel was patent and pulsatile. An image was saved and placed in the  patient's medical record. 2.  Abdominal aortogram.  3.  Right lower extremity angiogram.  4.  Angioplasty of right distal SFA and above-knee popliteal artery  using a 4 x 100 mm balloon, 5 x 250 IN. PACT, 5 x 20 mm balloon. SURGEON:  Emanuel Ramirez DO    ASSISTANTS:  None. ANESTHESIA:  Local sedation. COMPLICATIONS:  None apparent. CONTRAST:  100 mL. ASA CLASSIFICATION:  II.    MALLAMPATI SCORE:  II.    ESTIMATED BLOOD LOSS:  Minimal.    FINDINGS:  The patient does have what appears to be either residual  dissection of her angioplasty or re-entry site of the subintimal  crossing of the catheter and wire in the above-knee popliteal artery  that could potentially compromise the integrity of this repair. The  patient has small blood vessels and a NICKEL allergy that unfortunately  did not allow for placement of stents. The stents would be oversized as well. INDICATIONS:  This is a 77-year-old female patient who presented to my  partner's office for lifestyle-limiting claudication, mainly in her  right leg than her left leg. MITCHELL was found to be 0.60 on the right 1.04  on the left. There was evidence of stenosis in the distal SFA and mid  SFA as well as occlusion of the distal SFA.   The patent down to the level of the mid SFA. There was a  greater than 50% stenosis in this area. Beyond this, at the adductor  hiatus the artery occluded for around 10 cm. There was re-opacification of the  above-knee popliteal artery behind the knee. Runoff was maintained with  the peroneal artery runoff collateralizing the dorsalis pedis and posterior tibial  arteries. The mid portions of the posterior tibial and anterior tibial  arteries are occluded. The peroneal artery is the main  runoff. We then proceeded to heparinize the patient using 5000 units of heparin. We brought the 6-45 sheath up and over the bifurcation and placed into  the common femoral artery. Using a combination of a 0.035 TrailBlazer  and a Glidewire Advantage, we navigated down to the level of the  occlusion. We did multiple projection images to identify the  re-opacification at the above-knee popliteal artery. In a complicated  fashion, I was able to finally obtain luminal gain by re-entering the  vessel. I did have to cross the lesion in the subintimal fashion. My  re-entry point was roughly at the midportion of the above-knee popliteal  artery. Once we were satisfied that we crossed the lesion, we proceeded to  dilate the entire occlusive lesion using a 4 x 100 mm balloon. This  responded reasonably well except for her residual dissection. Unfortunately, the patient does have NICKEL allergy, and I do not know  if the ability to put a Nitinol stent is allowed and also her vessels  are very small, so I suspect this would be oversized stents for small  blood vessels and would likely cause an occlusion in the short-term. I  therefore proceeded to use a 5 x 250 mm drug-coated IN. PACT balloon. I  surmised that a long inflation would help with this dissection. However, I  did not believe that the distal segment was really dissection, but  actually the re-entry point. I then proceeded to dilate the drug coated balloon.    We left this up for 3 minutes. After this, the angiogram demonstrated more preferential flow down the  SFA. The runoff was maintained. I was still a little bit  concerned with this distal area where I likely re-entered the artery lumen. It seemed that I re-entered behind an area of hard plaque. Therefore, I  proceeded to use a 5 x 20 mm balloon with more radial force and dilated  this area with more radial pressure. After this, the image was appeared  to be slightly improved and then runoff was still maintained. Once we were satisfied, we proceed to pull the catheters and wires up  and over the bifurcation and I placed a short 6-Danish sheath in the  left common femoral artery. I took a femoral arteriogram, which  demonstrated good puncture above the bifurcation and below the inguinal  ligament. We used a Mynx for hemostasis. The patient exhibits palpable  dorsalis pedis pulses at the end of the case due to the collaterals  likely. There were no immediate complications. The patient was taken  to recovery room in stable condition. I explained to the patient that the severity  Of her disease and runoff might not lend itself to this being a long term solution. I do believe that long term she may be suited with a femoral to above knee popliteal artery   Bypass. The patient and  expressed understanding.         Beena Beaver DO    D: 12/09/2020 8:46:18       T: 12/09/2020 9:37:01     [email or web address]  Job#: [id number]     Doc#: [id number]    CC:

## 2020-12-09 NOTE — H&P
H&P Update - see note from Dr. Jeana Hernandez 11/10/20    I have reviewed the history and physical and examined the patient and find no relevant changes. I have reviewed with the patient and/or family the risks, benefits, and alternatives to the procedure. Pre-sedation Assessment    Patient:  Karly Almeida   :   1950  Intended Procedure: peripheral angiogram    Nursing notes reviewed and agreed. Medications reviewed  Allergies: Allergies   Allergen Reactions    Latex Hives    Penicillins Anaphylaxis and Hives    Darvon [Propoxyphene Hcl] Itching    Levaquin [Levofloxacin In D5w] Other (See Comments)     Muscle pain.  Nickel Rash         Pre-Procedure Assessment/Plan:  ASA 2 - Patient with mild systemic disease with no functional limitations    Mallampati Airway Assessment:  Mallampati Class II - (soft palate, fauces & uvula are visible)    Level of Sedation Plan: Moderate sedation    Post Procedure plan: Return to same level of care    Pascual Kumar DO, [address] [facility]

## 2020-12-11 LAB — VITAMIN B1, PLASMA: 17 NMOL/L (ref 4–15)

## 2021-04-27 ENCOUNTER — TELEPHONE (OUTPATIENT)
Dept: SURGERY | Age: 71
End: 2021-04-27

## 2021-04-27 ENCOUNTER — PROCEDURE VISIT (OUTPATIENT)
Dept: SURGERY | Age: 71
End: 2021-04-27
Payer: MEDICARE

## 2021-04-27 DIAGNOSIS — I73.9 CLAUDICATION IN PERIPHERAL VASCULAR DISEASE (HCC): ICD-10-CM

## 2021-04-27 DIAGNOSIS — I73.9 PAD (PERIPHERAL ARTERY DISEASE) (HCC): Primary | ICD-10-CM

## 2021-04-27 PROCEDURE — 93925 LOWER EXTREMITY STUDY: CPT | Performed by: SURGERY

## 2021-04-27 NOTE — TELEPHONE ENCOUNTER
I called and left a message for Zoraida Huddleston: results of bilateral lower extremity arteries is back and both legs are still open . She needs to call and schedule an appointment for 3 months. She needs to schedule a scan of bilateral carotids for November.

## 2023-03-23 ENCOUNTER — OFFICE VISIT (OUTPATIENT)
Dept: VASCULAR SURGERY | Age: 73
End: 2023-03-23
Payer: MEDICARE

## 2023-03-23 VITALS — DIASTOLIC BLOOD PRESSURE: 90 MMHG | SYSTOLIC BLOOD PRESSURE: 152 MMHG

## 2023-03-23 DIAGNOSIS — I73.9 CLAUDICATION IN PERIPHERAL VASCULAR DISEASE (HCC): ICD-10-CM

## 2023-03-23 DIAGNOSIS — I10 HYPERTENSION, UNSPECIFIED TYPE: ICD-10-CM

## 2023-03-23 DIAGNOSIS — I73.9 PERIPHERAL ARTERIAL DISEASE (HCC): Primary | ICD-10-CM

## 2023-03-23 DIAGNOSIS — I73.9 CLAUDICATION IN PERIPHERAL VASCULAR DISEASE (HCC): Primary | ICD-10-CM

## 2023-03-23 LAB
ANION GAP SERPL CALCULATED.3IONS-SCNC: 16 MMOL/L (ref 3–16)
BASOPHILS # BLD: 0 K/UL (ref 0–0.2)
BASOPHILS NFR BLD: 0.4 %
BUN SERPL-MCNC: 16 MG/DL (ref 7–20)
CALCIUM SERPL-MCNC: 9.7 MG/DL (ref 8.3–10.6)
CHLORIDE SERPL-SCNC: 105 MMOL/L (ref 99–110)
CO2 SERPL-SCNC: 22 MMOL/L (ref 21–32)
CREAT SERPL-MCNC: 0.8 MG/DL (ref 0.6–1.2)
DEPRECATED RDW RBC AUTO: 14.4 % (ref 12.4–15.4)
EOSINOPHIL # BLD: 0.1 K/UL (ref 0–0.6)
EOSINOPHIL NFR BLD: 1.5 %
GFR SERPLBLD CREATININE-BSD FMLA CKD-EPI: >60 ML/MIN/{1.73_M2}
GLUCOSE SERPL-MCNC: 104 MG/DL (ref 70–99)
HCT VFR BLD AUTO: 40.4 % (ref 36–48)
HGB BLD-MCNC: 13.5 G/DL (ref 12–16)
LYMPHOCYTES # BLD: 1.8 K/UL (ref 1–5.1)
LYMPHOCYTES NFR BLD: 20.5 %
MCH RBC QN AUTO: 29.6 PG (ref 26–34)
MCHC RBC AUTO-ENTMCNC: 33.3 G/DL (ref 31–36)
MCV RBC AUTO: 88.9 FL (ref 80–100)
MONOCYTES # BLD: 0.6 K/UL (ref 0–1.3)
MONOCYTES NFR BLD: 6.8 %
NEUTROPHILS # BLD: 6.3 K/UL (ref 1.7–7.7)
NEUTROPHILS NFR BLD: 70.8 %
PLATELET # BLD AUTO: 253 K/UL (ref 135–450)
PMV BLD AUTO: 8.6 FL (ref 5–10.5)
POTASSIUM SERPL-SCNC: 4.6 MMOL/L (ref 3.5–5.1)
RBC # BLD AUTO: 4.55 M/UL (ref 4–5.2)
SODIUM SERPL-SCNC: 143 MMOL/L (ref 136–145)
WBC # BLD AUTO: 9 K/UL (ref 4–11)

## 2023-03-23 PROCEDURE — G8427 DOCREV CUR MEDS BY ELIG CLIN: HCPCS | Performed by: STUDENT IN AN ORGANIZED HEALTH CARE EDUCATION/TRAINING PROGRAM

## 2023-03-23 PROCEDURE — 4004F PT TOBACCO SCREEN RCVD TLK: CPT | Performed by: STUDENT IN AN ORGANIZED HEALTH CARE EDUCATION/TRAINING PROGRAM

## 2023-03-23 PROCEDURE — 3017F COLORECTAL CA SCREEN DOC REV: CPT | Performed by: STUDENT IN AN ORGANIZED HEALTH CARE EDUCATION/TRAINING PROGRAM

## 2023-03-23 PROCEDURE — 1123F ACP DISCUSS/DSCN MKR DOCD: CPT | Performed by: STUDENT IN AN ORGANIZED HEALTH CARE EDUCATION/TRAINING PROGRAM

## 2023-03-23 PROCEDURE — G8400 PT W/DXA NO RESULTS DOC: HCPCS | Performed by: STUDENT IN AN ORGANIZED HEALTH CARE EDUCATION/TRAINING PROGRAM

## 2023-03-23 PROCEDURE — G8421 BMI NOT CALCULATED: HCPCS | Performed by: STUDENT IN AN ORGANIZED HEALTH CARE EDUCATION/TRAINING PROGRAM

## 2023-03-23 PROCEDURE — 1090F PRES/ABSN URINE INCON ASSESS: CPT | Performed by: STUDENT IN AN ORGANIZED HEALTH CARE EDUCATION/TRAINING PROGRAM

## 2023-03-23 PROCEDURE — 99214 OFFICE O/P EST MOD 30 MIN: CPT | Performed by: STUDENT IN AN ORGANIZED HEALTH CARE EDUCATION/TRAINING PROGRAM

## 2023-03-23 PROCEDURE — G8484 FLU IMMUNIZE NO ADMIN: HCPCS | Performed by: STUDENT IN AN ORGANIZED HEALTH CARE EDUCATION/TRAINING PROGRAM

## 2023-03-23 NOTE — LETTER
PERIPHERAL ANGIOGRAM INSTRUCTIONS      REPORT TO:  [facility] CARDIAC CATHETERIZATION LAB      WHAT IS A PERIPHERAL ANGIOGRAM?  An angiogram is a test used to diagnose problems with your arteries/veins. You may be familiar with a coronary angiogram, a test that focuses on the blood vessels feed the heart. In a peripheral angiogram, a doctor checks for problems in the blood vessels in other areas of your body. An angiogram uses real-time image guidance (fluoroscopy) and provides detailed information to help in planning the best treatment for you. How long does it take? A peripheral angiogram takes about 1-2 hours, longer if an intervention is performed. You will be in the hospital for at least 2-4 hours after the procedure is finished. HOW TO PREPARE:  *  Please notify us before the procedure if you are allergic to anything, especially x-ray     Contrast dye, shell fish, iodine, nickel or any type of jewelry. THIS IS VERY IMPORTANT. *  IT IS NECESSARY FOR YOU TO HAVE LABS DRAWN (CBC AND BMP) NO                     MORE THAN 30 DAYS PRIOR TO THIS PROCEDURE. FAILURE TO                COMPLETE THIS TASK MAY RESULT IN CANCELLATION OF CASE. IF LABS ARE PERFORMED AT A FACILITY OTHER THAN [facility],   PLEASE HAVE THESE RESULTS FAXED IMMEDIATELY [phone]. *  DO NOT eat or drink anything containing CAFFEINE for at least 24 hours  Prior to      Your procedure. *  DO NOT eat or drink anything after midnight (or 8 hours) prior to the procedure. *  If your procedure is scheduled in the afternoon, do not eat or drink anything after     ____________. You may have a light breakfast with juice. *  Please take your morning medication with a small amount of water at your normally     scheduled time, including any blood pressure pills. Hold Plavix and Aspirin morning of. *  Please HOLD any over the counter vitamins or supplements.   *  If

## 2023-03-23 NOTE — PROGRESS NOTES
pedal pulses  Skin:     General: Skin is warm and dry. Capillary Refill: Capillary refill takes 2 to 3 seconds. Neurological:      General: No focal deficit present. Mental Status: She is alert.         Nati Way DO, FACS, FSVS, [address] [facility]

## 2023-03-27 ENCOUNTER — HOSPITAL ENCOUNTER (OUTPATIENT)
Dept: CARDIAC CATH/INVASIVE PROCEDURES | Age: 73
Discharge: HOME | End: 2023-03-27
Payer: MEDICARE

## 2023-03-27 VITALS
DIASTOLIC BLOOD PRESSURE: 65 MMHG | WEIGHT: 136 LBS | OXYGEN SATURATION: 97 % | TEMPERATURE: 97.2 F | BODY MASS INDEX: 24.1 KG/M2 | HEIGHT: 63 IN | HEART RATE: 84 BPM | SYSTOLIC BLOOD PRESSURE: 126 MMHG | RESPIRATION RATE: 18 BRPM

## 2023-03-27 PROBLEM — I73.9 PERIPHERAL ARTERIAL DISEASE (HCC): Status: ACTIVE | Noted: 2023-03-27

## 2023-03-27 PROCEDURE — 2580000003 HC RX 258

## 2023-03-27 PROCEDURE — C1887 CATHETER, GUIDING: HCPCS

## 2023-03-27 PROCEDURE — C1769 GUIDE WIRE: HCPCS

## 2023-03-27 PROCEDURE — 6360000004 HC RX CONTRAST MEDICATION: Performed by: STUDENT IN AN ORGANIZED HEALTH CARE EDUCATION/TRAINING PROGRAM

## 2023-03-27 PROCEDURE — 37222 HC ILIAC TERRITORY ADDL PLASTY: CPT

## 2023-03-27 PROCEDURE — 37220 HC ILIAC TERRITORY PLASTY: CPT

## 2023-03-27 PROCEDURE — C2623 CATH, TRANSLUMIN, DRUG-COAT: HCPCS

## 2023-03-27 PROCEDURE — 75625 CONTRAST EXAM ABDOMINL AORTA: CPT

## 2023-03-27 PROCEDURE — 75710 ARTERY X-RAYS ARM/LEG: CPT

## 2023-03-27 PROCEDURE — 37224 HC FEM POP TERRITORY PLASTY: CPT

## 2023-03-27 PROCEDURE — C1760 CLOSURE DEV, VASC: HCPCS

## 2023-03-27 PROCEDURE — 2709999900 HC NON-CHARGEABLE SUPPLY

## 2023-03-27 PROCEDURE — C1725 CATH, TRANSLUMIN NON-LASER: HCPCS

## 2023-03-27 PROCEDURE — 99153 MOD SED SAME PHYS/QHP EA: CPT

## 2023-03-27 PROCEDURE — C1894 INTRO/SHEATH, NON-LASER: HCPCS

## 2023-03-27 PROCEDURE — 99152 MOD SED SAME PHYS/QHP 5/>YRS: CPT

## 2023-03-27 RX ORDER — SODIUM CHLORIDE 9 MG/ML
INJECTION, SOLUTION INTRAVENOUS PRN
Status: DISCONTINUED | OUTPATIENT
Start: 2023-03-27 | End: 2023-03-28 | Stop reason: HOSPADM

## 2023-03-27 RX ORDER — SODIUM CHLORIDE 0.9 % (FLUSH) 0.9 %
5-40 SYRINGE (ML) INJECTION PRN
Status: DISCONTINUED | OUTPATIENT
Start: 2023-03-27 | End: 2023-03-28 | Stop reason: HOSPADM

## 2023-03-27 RX ORDER — ACETAMINOPHEN 325 MG/1
650 TABLET ORAL EVERY 4 HOURS PRN
Status: DISCONTINUED | OUTPATIENT
Start: 2023-03-27 | End: 2023-03-28 | Stop reason: HOSPADM

## 2023-03-27 RX ORDER — SODIUM CHLORIDE 0.9 % (FLUSH) 0.9 %
5-40 SYRINGE (ML) INJECTION EVERY 12 HOURS SCHEDULED
Status: DISCONTINUED | OUTPATIENT
Start: 2023-03-27 | End: 2023-03-28 | Stop reason: HOSPADM

## 2023-03-27 RX ADMIN — IOPAMIDOL 145 ML: 755 INJECTION, SOLUTION INTRAVENOUS at 09:09

## 2023-03-27 ASSESSMENT — PAIN DESCRIPTION - DESCRIPTORS: DESCRIPTORS: ACHING

## 2023-03-27 ASSESSMENT — PAIN DESCRIPTION - ORIENTATION: ORIENTATION: RIGHT

## 2023-03-27 ASSESSMENT — PAIN SCALES - GENERAL: PAINLEVEL_OUTOF10: 8

## 2023-03-27 ASSESSMENT — PAIN DESCRIPTION - PAIN TYPE: TYPE: CHRONIC PAIN

## 2023-03-27 ASSESSMENT — PAIN DESCRIPTION - LOCATION: LOCATION: LEG

## 2023-03-27 NOTE — DISCHARGE INSTRUCTIONS
PERIPHERAL ANGIOGRAM    Care of your puncture site:  Remove bandage 24 hours after the procedure. 2023 9:30 AM  May shower in 24 hours but do not sit in a bathtub/pool of water for 5 days or until the wound is healed. Gently clean groin using soap and water. Dry thoroughly and apply a Band-Aid that covers the entire site. Use Band-Aid until skin heals over in about 3-5 days. Do not apply powder or lotion. Normal Observations:  Soreness or tenderness which may last one week. Mild oozing from the incision site. Possible bruising that could last 2 weeks. Activity:  You may resume driving 24 hours following the procedure. Do not make important / legal decisions within 24 hours after procedure. You may resume normal activity in 5 days or after the wound heals. Avoid lifting more than 10 pounds for 5 days or until the wound heals. Avoid strenuous exercise or activity for 1 week. Nutrition:  Regular diet or ***. Drink at least 8 to 10 glasses of decaffeinated, non-alcoholic fluid for the next 24 hours to flush the x-ray dye used for your angiogram out of your body. Call your doctor immediately if your condition worsens, for any other concerns, for a follow-up appointment or if you experience any of the [phone]  Increased swelling on the groin or leg. Unusual pain, numbness, or tingling of the groin or down the leg. Any signs of infection such as: redness, yellow drainage at the site, swelling or pain.     IF GROIN STARTS BLEEDING SIGNIFICANTLY:   LAY FLAT, HOLD FIRM DIRECT PRESSURE, AND CALL [phone]

## 2023-03-27 NOTE — H&P
H&P Update     I have reviewed the history and physical and examined the patient and find no relevant changes. I have reviewed with the patient and/or family the risks, benefits, and alternatives to the procedure. I HAVE PRESENTED REASONABLE ALTERNATIVES TO THE PATIENT'S PROPOSED CARE, TREATMENT, AND SERVICES. THE DISCUSSION I HAVE DONE ENCOMPASSED RISKS, BENEFITS, AND SIDE EFFECTS RELATED TO THE ALTERNATIVES AND THE RISKS RELATED TO NOT RECEIVING THE PROPOSED CARE, TREATMENT, SERVICES. Pre-sedation Assessment    Patient:  Ayesha Powers   :   1950  Intended Procedure: right lower extremity angiogram, possible intervention    Vitals:    23 0645   BP: 126/65   Pulse: 84   Resp: 18   Temp: 97.2 °F (36.2 °C)   SpO2: 97%       Nursing notes reviewed and agreed. Medications reviewed  Allergies: Allergies   Allergen Reactions    Latex Hives    Penicillins Anaphylaxis and Hives    Darvon [Propoxyphene Hcl] Itching    Levaquin [Levofloxacin In D5w] Other (See Comments)     Muscle pain. Nickel Rash         Pre-Procedure Assessment/Plan:  ASA 2 - Patient with mild systemic disease with no functional limitations    Mallampati Airway Assessment:  Mallampati Class II - (soft palate, fauces & uvula are visible)    Level of Sedation Plan: Moderate sedation    Post Procedure plan: Return to same level of care    Joleen Moran DO, FACS, FSVS, [address] [facility]

## 2023-03-28 NOTE — OP NOTE
[address] [facility]                                OPERATIVE REPORT    PATIENT NAME: Efrain Perales                 :        1950  MED REC NO:   [id number]                          ROOM:  ACCOUNT NO:   [de-identified]                           ADMIT DATE: 2023  PROVIDER:     Francis Luna DO    DATE OF PROCEDURE:  2023    PREOPERATIVE DIAGNOSIS:  Peripheral arterial disease. POSTOPERATIVE DIAGNOSIS:  Peripheral arterial disease. OPERATION PERFORMED:  1. Ultrasound-guided access to the left common femoral artery. 2.  Abdominal aortogram.  3.  Right lower extremity angiogram.  4.  Selective catheterization of the P2 segment of the popliteal artery  with angiogram and runoff. 5.  Balloon angioplasty of the SFA and popliteal artery using a 4  followed by a 5 x 220 mm Lutonix drug-coated balloon. 6.  Balloon angioplasty of the mid external iliac artery using 6 x 60 mm  balloon. 7.  Balloon angioplasty of the common iliac artery on the right side  using a 4 followed 6 x 60 mm angioplasty balloon. SURGEON:  Francis Luna DO    ANESTHESIA:  Local sedation. ESTIMATED BLOOD LOSS:  Less than 50 mL. COMPLICATIONS:  None apparent. ASA CLASSIFICATION:  II.    MALLAMPATI SCORE:  II.    INDICATIONS:  This is a 77-year-old female patient who is known to me  from a couple of years ago had undergone a right lower extremity  angiogram.  She underwent treatment with SFA angioplasty at that time. She has now presented two years later essentially without any  significant followup with the lifestyle limiting claudication and  concerns for potentially rest pain. She has no evidence of ulcerations. She denies any chest pain or shortness of breath. All the risks,  benefits, alternatives to angiography were clearly reviewed with the  patient.   The risks include pain, infection, bleeding,

## 2023-04-27 ENCOUNTER — PROCEDURE VISIT (OUTPATIENT)
Dept: SURGERY | Age: 73
End: 2023-04-27

## 2023-04-27 DIAGNOSIS — I73.9 PAD (PERIPHERAL ARTERY DISEASE) (HCC): Primary | ICD-10-CM

## 2023-04-27 DIAGNOSIS — R20.0 LEFT ARM NUMBNESS: ICD-10-CM

## 2023-05-15 ENCOUNTER — OFFICE VISIT (OUTPATIENT)
Dept: VASCULAR SURGERY | Age: 73
End: 2023-05-15
Payer: MEDICARE

## 2023-05-15 DIAGNOSIS — I73.9 PERIPHERAL ARTERIAL DISEASE (HCC): Primary | ICD-10-CM

## 2023-05-15 PROCEDURE — 1123F ACP DISCUSS/DSCN MKR DOCD: CPT | Performed by: STUDENT IN AN ORGANIZED HEALTH CARE EDUCATION/TRAINING PROGRAM

## 2023-05-15 PROCEDURE — 99213 OFFICE O/P EST LOW 20 MIN: CPT | Performed by: STUDENT IN AN ORGANIZED HEALTH CARE EDUCATION/TRAINING PROGRAM

## 2023-07-28 ENCOUNTER — PROCEDURE VISIT (OUTPATIENT)
Dept: SURGERY | Age: 73
End: 2023-07-28
Payer: MEDICARE

## 2023-07-28 DIAGNOSIS — I65.23 BILATERAL CAROTID ARTERY STENOSIS: ICD-10-CM

## 2023-07-28 DIAGNOSIS — I77.1 STENOSIS OF LEFT SUBCLAVIAN ARTERY (HCC): ICD-10-CM

## 2023-07-28 DIAGNOSIS — I73.9 PAD (PERIPHERAL ARTERY DISEASE) (HCC): Primary | ICD-10-CM

## 2023-07-28 PROCEDURE — 93880 EXTRACRANIAL BILAT STUDY: CPT | Performed by: SURGERY

## 2023-07-28 PROCEDURE — 93925 LOWER EXTREMITY STUDY: CPT | Performed by: SURGERY

## 2023-10-08 NOTE — PROGRESS NOTES
Wili Muro (:  1950) is a 74 y.o. female,Established patient, here for evaluation of the following chief complaint(s):  Follow-up (Numbness in left hand/fingers; feels light headed today;)         ASSESSMENT/PLAN:  1. Peripheral arterial disease ([address]    This is a 74year old female patient with discovered left upper extremity likely subclavian stenosis vs occlusion. She states her symptoms are not severe or lifestyle limiting at this time. Recommend blood pressure checks only in the right arm. 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 She might be better served eventually with carotid subclavian bypass grafting. For now given her relatively mild symptoms will follow clinically. Follow up in 6 months. Plan for carotid duplex as well. All questions answered. Subjective   SUBJECTIVE/OBJECTIVE:  This is a 74year old female patient who presents to the office today for evaluation of left upper extremity after undergoing left arm arterial ultrasound. The patient has been experiencing some numbness in the distal fingertips of her left upper extremity. Many of her symptoms began after undergoing humerus and distal radius fracture repairs in . With her associated PAD history this was concerning for subclavian artery stenosis vs occlusion. This was confirmed on duplex with associated left upper extremity monophasic flow and significant systolic blood pressure disparity. Patient admits to some pain in her hand but this followed her orthopedic injuries as well as associated  strength loss. Objective   Physical Exam  Cardiovascular:      Rate and Rhythm: Normal rate and regular rhythm. Comments: Weak left radial compared to right  Musculoskeletal:         General: Normal range of motion. Skin:     General: Skin is warm and dry. Capillary Refill: Capillary refill takes less than 2 seconds.
yellow

## 2024-05-30 DIAGNOSIS — I73.9 PERIPHERAL ARTERIAL DISEASE (HCC): ICD-10-CM

## 2024-05-30 DIAGNOSIS — R09.89 BRUIT OF RIGHT CAROTID ARTERY: ICD-10-CM

## 2024-05-30 DIAGNOSIS — G45.8 SUBCLAVIAN STEAL SYNDROME OF LEFT SUBCLAVIAN ARTERY: Primary | ICD-10-CM
